# Patient Record
Sex: FEMALE | Race: WHITE | NOT HISPANIC OR LATINO | Employment: FULL TIME | URBAN - NONMETROPOLITAN AREA
[De-identification: names, ages, dates, MRNs, and addresses within clinical notes are randomized per-mention and may not be internally consistent; named-entity substitution may affect disease eponyms.]

---

## 2020-02-27 ENCOUNTER — OFFICE VISIT (OUTPATIENT)
Dept: FAMILY MEDICINE CLINIC | Facility: CLINIC | Age: 55
End: 2020-02-27

## 2020-02-27 VITALS
DIASTOLIC BLOOD PRESSURE: 72 MMHG | SYSTOLIC BLOOD PRESSURE: 108 MMHG | WEIGHT: 196 LBS | OXYGEN SATURATION: 98 % | RESPIRATION RATE: 16 BRPM | HEIGHT: 63 IN | HEART RATE: 80 BPM | BODY MASS INDEX: 34.73 KG/M2 | TEMPERATURE: 98.1 F

## 2020-02-27 DIAGNOSIS — G44.229 CHRONIC TENSION-TYPE HEADACHE, NOT INTRACTABLE: ICD-10-CM

## 2020-02-27 DIAGNOSIS — Z76.89 ENCOUNTER TO ESTABLISH CARE: Primary | ICD-10-CM

## 2020-02-27 PROCEDURE — 99202 OFFICE O/P NEW SF 15 MIN: CPT | Performed by: NURSE PRACTITIONER

## 2020-02-27 RX ORDER — TIZANIDINE 4 MG/1
4 TABLET ORAL EVERY 8 HOURS PRN
Qty: 30 TABLET | Refills: 1 | Status: SHIPPED | OUTPATIENT
Start: 2020-02-27 | End: 2020-04-10 | Stop reason: SDUPTHER

## 2020-02-27 RX ORDER — TOPIRAMATE 25 MG/1
25 TABLET ORAL DAILY
Qty: 30 TABLET | Refills: 2 | Status: SHIPPED | OUTPATIENT
Start: 2020-02-27 | End: 2020-04-10 | Stop reason: SDUPTHER

## 2020-02-27 NOTE — PATIENT INSTRUCTIONS
General Headache Without Cause  A headache is pain or discomfort that is felt around the head or neck area. There are many causes and types of headaches. In some cases, the cause may not be found.  Follow these instructions at home:  Watch your condition for any changes. Let your doctor know about them. Take these steps to help with your condition:  Managing pain         · Take over-the-counter and prescription medicines only as told by your doctor.  · Lie down in a dark, quiet room when you have a headache.  · If told, put ice on your head and neck area:  ? Put ice in a plastic bag.  ? Place a towel between your skin and the bag.  ? Leave the ice on for 20 minutes, 2-3 times per day.  · If told, put heat on the affected area. Use the heat source that your doctor recommends, such as a moist heat pack or a heating pad.  ? Place a towel between your skin and the heat source.  ? Leave the heat on for 20-30 minutes.  ? Remove the heat if your skin turns bright red. This is very important if you are unable to feel pain, heat, or cold. You may have a greater risk of getting burned.  · Keep lights dim if bright lights bother you or make your headaches worse.  Eating and drinking  · Eat meals on a regular schedule.  · If you drink alcohol:  ? Limit how much you use to:  § 0-1 drink a day for women.  § 0-2 drinks a day for men.  ? Be aware of how much alcohol is in your drink. In the [place] one drink equals one 12 oz bottle of beer (355 mL), one 5 oz glass of wine (148 mL), or one 1½ oz glass of hard liquor (44 mL).  · Stop drinking caffeine, or reduce how much caffeine you drink.  General instructions    · Keep a journal to find out if certain things bring on headaches. For example, write down:  ? What you eat and drink.  ? How much sleep you get.  ? Any change to your diet or medicines.  · Get a massage or try other ways to relax.  · Limit stress.  · Sit up straight. Do not tighten (tense) your muscles.  · Do not use any  products that contain nicotine or tobacco. This includes cigarettes, e-cigarettes, and chewing tobacco. If you need help quitting, ask your doctor.  · Exercise regularly as told by your doctor.  · Get enough sleep. This often means 7-9 hours of sleep each night.  · Keep all follow-up visits as told by your doctor. This is important.  Contact a doctor if:  · Your symptoms are not helped by medicine.  · You have a headache that feels different than the other headaches.  · You feel sick to your stomach (nauseous) or you throw up (vomit).  · You have a fever.  Get help right away if:  · Your headache gets very bad quickly.  · Your headache gets worse after a lot of physical activity.  · You keep throwing up.  · You have a stiff neck.  · You have trouble seeing.  · You have trouble speaking.  · You have pain in the eye or ear.  · Your muscles are weak or you lose muscle control.  · You lose your balance or have trouble walking.  · You feel like you will pass out (faint) or you pass out.  · You are mixed up (confused).  · You have a seizure.  Summary  · A headache is pain or discomfort that is felt around the head or neck area.  · There are many causes and types of headaches. In some cases, the cause may not be found.  · Keep a journal to help find out what causes your headaches. Watch your condition for any changes. Let your doctor know about them.  · Contact a doctor if you have a headache that is different from usual, or if your headache is not helped by medicine.  · Get help right away if your headache gets very bad, you throw up, you have trouble seeing, you lose your balance, or you have a seizure.  This information is not intended to replace advice given to you by your health care provider. Make sure you discuss any questions you have with your health care provider.  Document Released: 09/26/2009 Document Revised: 07/08/2019 Document Reviewed: 07/08/2019  Elsevier Interactive Patient Education © 2020 [employer]

## 2020-02-27 NOTE — PROGRESS NOTES
"CC: Establish Care      Subjective:  Jazmin Jennings is a 54 y.o. female who presents for         Here to establish care. No significant PMH. No medications. Has had a  and ovarian cyst removed. Family and Social History reviewed. Does c/o daily headaches ongoing x 1 year. Described as an ache. Treats with cold packs which does help. Rates at 2-7 on pain scale. States is different everyday. Has headaches daily. Denies visual disturbance, light or sound sensitivity, nausea, or vomiting.       The following portions of the patient's history were reviewed and updated as appropriate: allergies, current medications, past family history, past medical history, past social history, past surgical history and problem list.    History reviewed. No pertinent past medical history.      Current Outpatient Medications:   •  tiZANidine (ZANAFLEX) 4 MG tablet, Take 1 tablet by mouth Every 8 (Eight) Hours As Needed for Muscle Spasms., Disp: 30 tablet, Rfl: 1  •  topiramate (TOPAMAX) 25 MG tablet, Take 1 tablet by mouth Daily., Disp: 30 tablet, Rfl: 2    Review of Systems    Review of Systems   Constitutional: Negative.    Eyes: Negative.    Respiratory: Negative.    Cardiovascular: Negative.    Gastrointestinal: Negative.    Endocrine: Negative.    Genitourinary: Negative.    Musculoskeletal: Positive for neck pain.   Skin: Negative.    Allergic/Immunologic: Negative.    Neurological: Positive for headaches.   Hematological: Negative.    Psychiatric/Behavioral: Negative.        Objective  Vitals:    20 1424   BP: 108/72   Pulse: 80   Resp: 16   Temp: 98.1 °F (36.7 °C)   TempSrc: Oral   SpO2: 98%   Weight: 88.9 kg (196 lb)   Height: 160 cm (63\")     Body mass index is 34.72 kg/m².    Physical Exam    Physical Exam   Constitutional: She is oriented to person, place, and time. She appears well-developed and well-nourished. No distress.   HENT:   Head: Normocephalic and atraumatic.   Right Ear: External ear normal.   Left Ear: " External ear normal.   Eyes: Pupils are equal, round, and reactive to light. Conjunctivae and EOM are normal. Right eye exhibits no discharge. Left eye exhibits no discharge. No scleral icterus.   Neck: Normal range of motion. Neck supple. No JVD present. No tracheal deviation present. No thyromegaly present.   Cardiovascular: Normal rate, regular rhythm, normal heart sounds and intact distal pulses. Exam reveals no gallop and no friction rub.   No murmur heard.  Pulmonary/Chest: Effort normal and breath sounds normal. No stridor. No respiratory distress. She has no wheezes. She has no rales. She exhibits no tenderness.   Abdominal: Soft. Bowel sounds are normal. She exhibits no distension and no mass. There is no tenderness. There is no rebound and no guarding. No hernia.   Musculoskeletal: She exhibits no edema.   Lymphadenopathy:     She has no cervical adenopathy.   Neurological: She is alert and oriented to person, place, and time. No cranial nerve deficit.   Skin: Skin is warm and dry.   Psychiatric: She has a normal mood and affect. Her behavior is normal. Judgment and thought content normal.   Nursing note and vitals reviewed.          Jazmin was seen today for establish care.    Diagnoses and all orders for this visit:    Encounter to establish care    Chronic tension-type headache, not intractable  -     tiZANidine (ZANAFLEX) 4 MG tablet; Take 1 tablet by mouth Every 8 (Eight) Hours As Needed for Muscle Spasms.  -     topiramate (TOPAMAX) 25 MG tablet; Take 1 tablet by mouth Daily.       Will initiate zanaflex and topamax as above for headaches since pt is having daily headaches to see if this will help. Will follow up in 3 months for complete exam with labs and pap. All questions answered. Pt verbalized understanding of plan of care.    This document has been electronically signed by FATIMAH Schaefer on February 27, 2020 3:24 PM

## 2020-04-10 DIAGNOSIS — G44.229 CHRONIC TENSION-TYPE HEADACHE, NOT INTRACTABLE: ICD-10-CM

## 2020-04-10 RX ORDER — TIZANIDINE 4 MG/1
4 TABLET ORAL EVERY 8 HOURS PRN
Qty: 30 TABLET | Refills: 1 | Status: SHIPPED | OUTPATIENT
Start: 2020-04-10 | End: 2020-07-14 | Stop reason: SDUPTHER

## 2020-04-10 RX ORDER — TOPIRAMATE 25 MG/1
25 TABLET ORAL DAILY
Qty: 30 TABLET | Refills: 3 | Status: SHIPPED | OUTPATIENT
Start: 2020-04-10 | End: 2021-01-18

## 2020-07-14 DIAGNOSIS — G44.229 CHRONIC TENSION-TYPE HEADACHE, NOT INTRACTABLE: ICD-10-CM

## 2020-07-14 RX ORDER — TIZANIDINE 4 MG/1
4 TABLET ORAL EVERY 8 HOURS PRN
Qty: 30 TABLET | Refills: 1 | Status: SHIPPED | OUTPATIENT
Start: 2020-07-14 | End: 2021-07-13

## 2020-09-22 ENCOUNTER — LAB (OUTPATIENT)
Dept: LAB | Facility: OTHER | Age: 55
End: 2020-09-22

## 2020-09-22 ENCOUNTER — OFFICE VISIT (OUTPATIENT)
Dept: FAMILY MEDICINE CLINIC | Facility: CLINIC | Age: 55
End: 2020-09-22

## 2020-09-22 VITALS
HEIGHT: 63 IN | SYSTOLIC BLOOD PRESSURE: 140 MMHG | DIASTOLIC BLOOD PRESSURE: 92 MMHG | HEART RATE: 84 BPM | BODY MASS INDEX: 36.04 KG/M2 | WEIGHT: 203.4 LBS

## 2020-09-22 DIAGNOSIS — Z00.00 ANNUAL PHYSICAL EXAM: ICD-10-CM

## 2020-09-22 DIAGNOSIS — Z12.31 SCREENING MAMMOGRAM, ENCOUNTER FOR: ICD-10-CM

## 2020-09-22 DIAGNOSIS — Z12.4 PAP SMEAR FOR CERVICAL CANCER SCREENING: ICD-10-CM

## 2020-09-22 DIAGNOSIS — Z00.00 ANNUAL PHYSICAL EXAM: Primary | ICD-10-CM

## 2020-09-22 LAB
ALBUMIN SERPL-MCNC: 4.6 G/DL (ref 3.5–5)
ALBUMIN/GLOB SERPL: 1.4 G/DL (ref 1.1–1.8)
ALP SERPL-CCNC: 58 U/L (ref 38–126)
ALT SERPL W P-5'-P-CCNC: 65 U/L
ANION GAP SERPL CALCULATED.3IONS-SCNC: 7 MMOL/L (ref 5–15)
AST SERPL-CCNC: 39 U/L (ref 14–36)
BASOPHILS # BLD AUTO: 0.06 10*3/MM3 (ref 0–0.2)
BASOPHILS NFR BLD AUTO: 1 % (ref 0–1.5)
BILIRUB SERPL-MCNC: 0.5 MG/DL (ref 0.2–1.3)
BUN SERPL-MCNC: 16 MG/DL (ref 7–23)
BUN/CREAT SERPL: 19 (ref 7–25)
CALCIUM SPEC-SCNC: 9.2 MG/DL (ref 8.4–10.2)
CHLORIDE SERPL-SCNC: 104 MMOL/L (ref 101–112)
CO2 SERPL-SCNC: 29 MMOL/L (ref 22–30)
CREAT SERPL-MCNC: 0.84 MG/DL (ref 0.52–1.04)
DEPRECATED RDW RBC AUTO: 42.6 FL (ref 37–54)
EOSINOPHIL # BLD AUTO: 0.27 10*3/MM3 (ref 0–0.4)
EOSINOPHIL NFR BLD AUTO: 4.7 % (ref 0.3–6.2)
ERYTHROCYTE [DISTWIDTH] IN BLOOD BY AUTOMATED COUNT: 13.2 % (ref 12.3–15.4)
GFR SERPL CREATININE-BSD FRML MDRD: 70 ML/MIN/1.73 (ref 51–120)
GLOBULIN UR ELPH-MCNC: 3.3 GM/DL (ref 2.3–3.5)
GLUCOSE SERPL-MCNC: 103 MG/DL (ref 70–99)
HCT VFR BLD AUTO: 40.8 % (ref 34–46.6)
HGB BLD-MCNC: 13.6 G/DL (ref 12–15.9)
LYMPHOCYTES # BLD AUTO: 2.52 10*3/MM3 (ref 0.7–3.1)
LYMPHOCYTES NFR BLD AUTO: 44 % (ref 19.6–45.3)
MCH RBC QN AUTO: 29.8 PG (ref 26.6–33)
MCHC RBC AUTO-ENTMCNC: 33.3 G/DL (ref 31.5–35.7)
MCV RBC AUTO: 89.5 FL (ref 79–97)
MONOCYTES # BLD AUTO: 0.41 10*3/MM3 (ref 0.1–0.9)
MONOCYTES NFR BLD AUTO: 7.2 % (ref 5–12)
NEUTROPHILS NFR BLD AUTO: 2.47 10*3/MM3 (ref 1.7–7)
NEUTROPHILS NFR BLD AUTO: 43.1 % (ref 42.7–76)
PLATELET # BLD AUTO: 259 10*3/MM3 (ref 140–450)
PMV BLD AUTO: 9.6 FL (ref 6–12)
POTASSIUM SERPL-SCNC: 4.3 MMOL/L (ref 3.4–5)
PROT SERPL-MCNC: 7.9 G/DL (ref 6.3–8.6)
RBC # BLD AUTO: 4.56 10*6/MM3 (ref 3.77–5.28)
SODIUM SERPL-SCNC: 140 MMOL/L (ref 137–145)
WBC # BLD AUTO: 5.73 10*3/MM3 (ref 3.4–10.8)

## 2020-09-22 PROCEDURE — 80061 LIPID PANEL: CPT | Performed by: NURSE PRACTITIONER

## 2020-09-22 PROCEDURE — 80053 COMPREHEN METABOLIC PANEL: CPT | Performed by: NURSE PRACTITIONER

## 2020-09-22 PROCEDURE — 86803 HEPATITIS C AB TEST: CPT | Performed by: NURSE PRACTITIONER

## 2020-09-22 PROCEDURE — 85025 COMPLETE CBC W/AUTO DIFF WBC: CPT | Performed by: NURSE PRACTITIONER

## 2020-09-22 PROCEDURE — 99396 PREV VISIT EST AGE 40-64: CPT | Performed by: NURSE PRACTITIONER

## 2020-09-22 PROCEDURE — 83036 HEMOGLOBIN GLYCOSYLATED A1C: CPT | Performed by: NURSE PRACTITIONER

## 2020-09-22 PROCEDURE — 84443 ASSAY THYROID STIM HORMONE: CPT | Performed by: NURSE PRACTITIONER

## 2020-09-22 PROCEDURE — 36415 COLL VENOUS BLD VENIPUNCTURE: CPT | Performed by: NURSE PRACTITIONER

## 2020-09-22 NOTE — PROGRESS NOTES
"CC: Gynecologic Exam      Subjective:  Jazmin Jennings is a 55 y.o. female who presents for     Here for check up with labs. Is due for pap. LMP 2013. Periods were regular prior to menopause. Denies Post menopausal bleeding. Last pap done 2012. Has never had an abnormal pap smear. Does perform SBE at home. Please see scanned information sheet for details.      The following portions of the patient's history were reviewed and updated as appropriate: allergies, current medications, past family history, past medical history, past social history, past surgical history and problem list.    No past medical history on file.      Current Outpatient Medications:   •  tiZANidine (Zanaflex) 4 MG tablet, Take 1 tablet by mouth Every 8 (Eight) Hours As Needed for Muscle Spasms., Disp: 30 tablet, Rfl: 1  •  topiramate (Topamax) 25 MG tablet, Take 1 tablet by mouth Daily., Disp: 30 tablet, Rfl: 3    Review of Systems    Review of Systems   Constitutional: Negative.    HENT: Negative.    Eyes: Negative.    Respiratory: Negative.    Cardiovascular: Negative.    Gastrointestinal: Negative.    Endocrine: Negative.    Genitourinary: Negative.    Musculoskeletal: Negative.    Skin: Negative.    Allergic/Immunologic: Negative.    Neurological: Negative.    Hematological: Negative.    Psychiatric/Behavioral: Negative.    All other systems reviewed and are negative.      Objective  Vitals:    09/22/20 0916   BP: 140/92   Pulse: 84   Weight: 92.3 kg (203 lb 6.4 oz)   Height: 160 cm (63\")     Body mass index is 36.03 kg/m².    Physical Exam    Physical Exam  Vitals signs and nursing note reviewed. Exam conducted with a chaperone present.   Constitutional:       General: She is not in acute distress.     Appearance: Normal appearance. She is well-developed. She is not ill-appearing, toxic-appearing or diaphoretic.   HENT:      Head: Normocephalic and atraumatic.   Eyes:      General: No scleral icterus.        Right eye: No discharge.         Left " eye: No discharge.      Extraocular Movements: Extraocular movements intact.      Conjunctiva/sclera: Conjunctivae normal.   Neck:      Musculoskeletal: Normal range of motion and neck supple. No neck rigidity or muscular tenderness.      Vascular: No carotid bruit.   Cardiovascular:      Rate and Rhythm: Normal rate and regular rhythm.      Heart sounds: Normal heart sounds. No murmur. No friction rub. No gallop.    Pulmonary:      Effort: Pulmonary effort is normal. No respiratory distress.      Breath sounds: Normal breath sounds. No stridor. No wheezing, rhonchi or rales.   Chest:      Chest wall: No mass, lacerations, deformity, swelling, tenderness, crepitus or edema. There is no dullness to percussion.      Breasts: Breasts are symmetrical.         Right: Normal. No swelling, bleeding, inverted nipple, mass, nipple discharge, skin change or tenderness.         Left: Normal. No swelling, bleeding, inverted nipple, mass, nipple discharge, skin change or tenderness.   Abdominal:      General: Bowel sounds are normal. There is no distension.      Palpations: Abdomen is soft. There is no mass.      Tenderness: There is no abdominal tenderness. There is no guarding or rebound.      Hernia: No hernia is present. There is no hernia in the left inguinal area or right inguinal area.   Genitourinary:     General: Normal vulva.      Pubic Area: No rash or pubic lice.       Labia:         Right: No rash, tenderness, lesion or injury.         Left: No rash, tenderness, lesion or injury.       Urethra: No prolapse, urethral pain, urethral swelling or urethral lesion.      Vagina: No signs of injury and foreign body. No vaginal discharge, erythema, tenderness, bleeding, lesions or prolapsed vaginal walls.      Cervix: No cervical motion tenderness, discharge, friability, lesion, erythema, cervical bleeding or eversion.      Uterus: Normal. Not deviated, not enlarged, not fixed, not tender and no uterine prolapse.        Adnexa: Right adnexa normal and left adnexa normal.        Right: No mass, tenderness or fullness.          Left: No mass, tenderness or fullness.        Rectum: Normal. Guaiac result negative. No mass, tenderness, anal fissure, external hemorrhoid or internal hemorrhoid. Normal anal tone.   Musculoskeletal: Normal range of motion.         General: No swelling.      Right lower leg: No edema.      Left lower leg: No edema.   Lymphadenopathy:      Cervical: No cervical adenopathy.      Upper Body:      Right upper body: No supraclavicular, axillary or pectoral adenopathy.      Left upper body: No supraclavicular, axillary or pectoral adenopathy.      Lower Body: No right inguinal adenopathy. No left inguinal adenopathy.   Skin:     General: Skin is warm and dry.      Capillary Refill: Capillary refill takes less than 2 seconds.      Coloration: Skin is not jaundiced or pale.      Findings: No bruising, erythema, lesion or rash.   Neurological:      General: No focal deficit present.      Mental Status: She is alert and oriented to person, place, and time.   Psychiatric:         Mood and Affect: Mood normal.         Behavior: Behavior normal.         Thought Content: Thought content normal.         Judgment: Judgment normal.             Jazmin was seen today for gynecologic exam.    Diagnoses and all orders for this visit:    Annual physical exam  -     CBC w AUTO Differential; Future  -     Comprehensive metabolic panel; Future  -     TSH  -     Lipid Panel With LDL/HDL Ratio; Future  -     Hemoglobin A1c  -     Hepatitis C antibody; Future    Pap smear for cervical cancer screening  -     Liquid-based Pap Smear, Screening    Screening mammogram, encounter for  -     Mammo screening digital tomosynthesis bilateral w CAD; Future       Will check routine labs as above  Pap performed and sent to lab  Mammogram ordered as above  Pt declined Colonoscopy, Shingrix, and Tdap today  To follow up in 1 year or sooner if  needed  Answered all questions  Pt verbalized understanding of plan of care    This document has been electronically signed by FATIMAH Schaefer on September 22, 2020 09:52 CDT

## 2020-09-23 LAB
CHOLEST SERPL-MCNC: 243 MG/DL (ref 0–200)
HBA1C MFR BLD: 5.4 % (ref 4.8–5.6)
HCV AB SER DONR QL: NORMAL
HDLC SERPL-MCNC: 47 MG/DL (ref 40–60)
LDLC SERPL CALC-MCNC: 168 MG/DL (ref 0–100)
LDLC/HDLC SERPL: 3.58 {RATIO}
TRIGL SERPL-MCNC: 138 MG/DL (ref 0–150)
TSH SERPL DL<=0.05 MIU/L-ACNC: 3.56 UIU/ML (ref 0.27–4.2)
VLDLC SERPL-MCNC: 27.6 MG/DL (ref 5–40)

## 2020-09-28 LAB
LAB AP CASE REPORT: NORMAL
PATH INTERP SPEC-IMP: NORMAL

## 2020-11-11 ENCOUNTER — OFFICE VISIT (OUTPATIENT)
Dept: FAMILY MEDICINE CLINIC | Facility: CLINIC | Age: 55
End: 2020-11-11

## 2020-11-11 DIAGNOSIS — M21.612 BUNION, LEFT FOOT: Primary | ICD-10-CM

## 2020-11-11 DIAGNOSIS — M54.2 NECK PAIN: ICD-10-CM

## 2020-11-11 DIAGNOSIS — R51.9 FREQUENT HEADACHES: ICD-10-CM

## 2020-11-11 PROCEDURE — 99442 PR PHYS/QHP TELEPHONE EVALUATION 11-20 MIN: CPT | Performed by: NURSE PRACTITIONER

## 2020-11-11 RX ORDER — IBUPROFEN 800 MG/1
800 TABLET ORAL EVERY 8 HOURS PRN
Qty: 90 TABLET | Refills: 1 | Status: SHIPPED | OUTPATIENT
Start: 2020-11-11 | End: 2021-07-13 | Stop reason: SDUPTHER

## 2020-11-11 NOTE — PROGRESS NOTES
CC: Lower Extremity Issue (Left foot toes turning inward)      Subjective:  Jazmin Jennings is a 55 y.o. female who presents for   Telephone visit because of pandemic. C/O left foot first toe and 5th toe pointing to the left. Onset gradually over the last couple of years. States developed pain in the toes about 6 weeks ago. When she is up on her foot for long periods of timel, the pain is aggravated. Pain is described as sharp and achy. The pain is pretty much constant unless she sits for long periods. Pain radiates into the ankle. Rates pain at 10 on pain scale at its worst. Has treated with ASA or Tylenol for the pain which doesn't help the pain.    Does have morning headaches/neck pain daily after waking up. Pain radiates from the neck into the head. Treated with ice packs. Denies associated light or sound sensitivity, no nausea or vomiting noted. The ice packs help the pain.      The following portions of the patient's history were reviewed and updated as appropriate: allergies, current medications, past family history, past medical history, past social history, past surgical history and problem list.    History reviewed. No pertinent past medical history.      Current Outpatient Medications:   •  tiZANidine (Zanaflex) 4 MG tablet, Take 1 tablet by mouth Every 8 (Eight) Hours As Needed for Muscle Spasms., Disp: 30 tablet, Rfl: 1  •  topiramate (Topamax) 25 MG tablet, Take 1 tablet by mouth Daily., Disp: 30 tablet, Rfl: 3  •  ibuprofen (ADVIL,MOTRIN) 800 MG tablet, Take 1 tablet by mouth Every 8 (Eight) Hours As Needed for Mild Pain , Moderate Pain  or Headache., Disp: 90 tablet, Rfl: 1    Review of Systems    Review of Systems   Constitutional: Negative.    Eyes: Negative.    Respiratory: Negative.    Cardiovascular: Negative.    Gastrointestinal: Negative.    Endocrine: Negative.    Genitourinary: Negative.    Musculoskeletal: Positive for arthralgias and neck pain.   Skin: Negative.    Allergic/Immunologic:  Negative.    Neurological: Positive for headaches.   Hematological: Negative.    Psychiatric/Behavioral: Negative.    All other systems reviewed and are negative.      Objective  There were no vitals filed for this visit.  There is no height or weight on file to calculate BMI.    Physical Exam    Physical Exam  Deferred, Telephone visit because of pandemic      Diagnoses and all orders for this visit:    1. Bunion, left foot (Primary)  -     Ambulatory Referral to Podiatry  -     ibuprofen (ADVIL,MOTRIN) 800 MG tablet; Take 1 tablet by mouth Every 8 (Eight) Hours As Needed for Mild Pain , Moderate Pain  or Headache.  Dispense: 90 tablet; Refill: 1    2. Neck pain  -     XR Spine Cervical Complete 4 or 5 View; Future  -     ibuprofen (ADVIL,MOTRIN) 800 MG tablet; Take 1 tablet by mouth Every 8 (Eight) Hours As Needed for Mild Pain , Moderate Pain  or Headache.  Dispense: 90 tablet; Refill: 1    3. Frequent headaches  -     XR Spine Cervical Complete 4 or 5 View; Future  -     ibuprofen (ADVIL,MOTRIN) 800 MG tablet; Take 1 tablet by mouth Every 8 (Eight) Hours As Needed for Mild Pain , Moderate Pain  or Headache.  Dispense: 90 tablet; Refill: 1       Will refer patient to podiatry for the bunion of the left foot.  Will obtain x-rays of the C-spine secondary to patient having neck pain and frequent headaches.  Will prescribe ibuprofen 800 mg 1 p.o. 3 times daily as needed as above.  Answered all questions.  Patient verbalized understanding of plan of care.  To follow-up on a as needed basis.    This document has been electronically signed by FATIMAH Schaefer on November 11, 2020 14:04 CST     You have chosen to receive care through a telephone visit. Do you consent to use a telephone visit for your medical care today? Yes  This visit has been rescheduled as a phone visit to comply with patient safety concerns in accordance with CDC recommendations. Total time of discussion was 11 minutes.

## 2020-11-11 NOTE — PATIENT INSTRUCTIONS
Bunion    A bunion is a bump on the base of the big toe that forms when the bones of the big toe joint move out of position. Bunions may be small at first, but they often get larger over time. They can make walking painful.  What are the causes?  A bunion may be caused by:  · Wearing narrow or pointed shoes that force the big toe to press against the other toes.  · Abnormal foot development that causes the foot to roll inward (pronate).  · Changes in the foot that are caused by certain diseases, such as rheumatoid arthritis or polio.  · A foot injury.  What increases the risk?  The following factors may make you more likely to develop this condition:  · Wearing shoes that squeeze the toes together.  · Having certain diseases, such as:  ? Rheumatoid arthritis.  ? Polio.  ? Cerebral palsy.  · Having family members who have bunions.  · Being born with a foot deformity, such as flat feet or low arches.  · Doing activities that put a lot of pressure on the feet, such as ballet dancing.  What are the signs or symptoms?  The main symptom of a bunion is a noticeable bump on the big toe. Other symptoms may include:  · Pain.  · Swelling around the big toe.  · Redness and inflammation.  · Thick or hardened skin on the big toe or between the toes.  · Stiffness or loss of motion in the big toe.  · Trouble with walking.  How is this diagnosed?  A bunion may be diagnosed based on your symptoms, medical history, and activities. You may have tests, such as:  · X-rays. These allow your health care provider to check the position of the bones in your foot and look for damage to your joint. They also help your health care provider determine the severity of your bunion and the best way to treat it.  · Joint aspiration. In this test, a sample of fluid is removed from the toe joint. This test may be done if you are in a lot of pain. It helps rule out diseases that cause painful swelling of the joints, such as arthritis.  How is this  treated?  Treatment depends on the severity of your symptoms. The goal of treatment is to relieve symptoms and prevent the bunion from getting worse. Your health care provider may recommend:  · Wearing shoes that have a wide toe box.  · Using bunion pads to cushion the affected area.  · Taping your toes together to keep them in a normal position.  · Placing a device inside your shoe (orthotics) to help reduce pressure on your toe joint.  · Taking medicine to ease pain, inflammation, and swelling.  · Applying heat or ice to the affected area.  · Doing stretching exercises.  · Surgery to remove scar tissue and move the toes back into their normal position. This treatment is rare.  Follow these instructions at home:  Managing pain, stiffness, and swelling    · If directed, put ice on the painful area:  ? Put ice in a plastic bag.  ? Place a towel between your skin and the bag.  ? Leave the ice on for 20 minutes, 2-3 times a day.  Activity    · If directed, apply heat to the affected area before you exercise. Use the heat source that your health care provider recommends, such as a moist heat pack or a heating pad.  ? Place a towel between your skin and the heat source.  ? Leave the heat on for 20-30 minutes.  ? Remove the heat if your skin turns bright red. This is especially important if you are unable to feel pain, heat, or cold. You may have a greater risk of getting burned.  · Do exercises as told by your health care provider.  General instructions  · Support your toe joint with proper footwear, shoe padding, or taping as told by your health care provider.  · Take over-the-counter and prescription medicines only as told by your health care provider.  · Keep all follow-up visits as told by your health care provider. This is important.  Contact a health care provider if your symptoms:  · Get worse.  · Do not improve in 2 weeks.  Get help right away if you have:  · Severe pain and trouble with walking.  Summary  · A  bunion is a bump on the base of the big toe that forms when the bones of the big toe joint move out of position.  · Bunions can make walking painful.  · Treatment depends on the severity of your symptoms.  · Support your toe joint with proper footwear, shoe padding, or taping as told by your health care provider.  This information is not intended to replace advice given to you by your health care provider. Make sure you discuss any questions you have with your health care provider.  Document Released: 12/18/2006 Document Revised: 06/24/2019 Document Reviewed: 04/30/2019  Elsevier Patient Education © 2020 [employer]

## 2021-01-04 ENCOUNTER — OFFICE VISIT (OUTPATIENT)
Dept: PODIATRY | Facility: CLINIC | Age: 56
End: 2021-01-04

## 2021-01-04 VITALS — HEIGHT: 63 IN | BODY MASS INDEX: 38.62 KG/M2 | OXYGEN SATURATION: 97 % | HEART RATE: 83 BPM | WEIGHT: 218 LBS

## 2021-01-04 DIAGNOSIS — M79.672 LEFT FOOT PAIN: Primary | ICD-10-CM

## 2021-01-04 DIAGNOSIS — M21.612 BUNION OF LEFT FOOT: ICD-10-CM

## 2021-01-04 PROCEDURE — 99204 OFFICE O/P NEW MOD 45 MIN: CPT | Performed by: PODIATRIST

## 2021-01-04 RX ORDER — BUPIVACAINE HCL/0.9 % NACL/PF 0.1 %
2 PLASTIC BAG, INJECTION (ML) EPIDURAL ONCE
Status: CANCELLED | OUTPATIENT
Start: 2021-01-21 | End: 2021-01-04

## 2021-01-04 NOTE — PROGRESS NOTES
Jazmin Jennings  1965  55 y.o. female     Patient presents to clinic today with the complaint of left foot pain, XR obtained today.     2021    Chief Complaint   Patient presents with   • Left Foot - Pain       History of Present Illness    Jazmin Jennings is a 55 y.o.female who presents to clinic today with chief complaint of left foot pain.  Pain is located to the great toe joint.  Pain has been getting progressively worse over the last couple of years.  There are no associated injuries.  Pain is aggravated with prolonged weightbearing and relieved with rest.  She has no other complaints.    Past Medical History:   Diagnosis Date   • Bunion    • Ingrown toenail          Past Surgical History:   Procedure Laterality Date   •  SECTION     • OVARIAN CYST REMOVAL           Family History   Problem Relation Age of Onset   • Heart disease Mother    • Cancer Father    • Cancer Paternal Aunt        No Known Allergies    Social History     Socioeconomic History   • Marital status:      Spouse name: Not on file   • Number of children: Not on file   • Years of education: Not on file   • Highest education level: Not on file   Tobacco Use   • Smoking status: Never Smoker   • Smokeless tobacco: Never Used         Current Outpatient Medications   Medication Sig Dispense Refill   • ibuprofen (ADVIL,MOTRIN) 800 MG tablet Take 1 tablet by mouth Every 8 (Eight) Hours As Needed for Mild Pain , Moderate Pain  or Headache. 90 tablet 1   • tiZANidine (Zanaflex) 4 MG tablet Take 1 tablet by mouth Every 8 (Eight) Hours As Needed for Muscle Spasms. 30 tablet 1   • topiramate (Topamax) 25 MG tablet Take 1 tablet by mouth Daily. 30 tablet 3     No current facility-administered medications for this visit.        Review of Systems   Constitutional: Negative.    HENT: Negative.    Eyes: Negative.    Respiratory: Positive for cough.    Cardiovascular: Negative.    Gastrointestinal: Negative.    Endocrine: Negative.   "  Genitourinary: Negative.    Musculoskeletal:        Foot pain   Skin: Negative.    Allergic/Immunologic: Negative.    Neurological: Negative.    Hematological: Negative.    Psychiatric/Behavioral: Negative.          OBJECTIVE    Pulse 83   Ht 160 cm (63\")   Wt 98.9 kg (218 lb)   SpO2 97%   BMI 38.62 kg/m²       Physical Exam  Vitals signs reviewed.   Constitutional:       General: She is not in acute distress.     Appearance: She is well-developed.   HENT:      Head: Normocephalic and atraumatic.      Nose: Nose normal.   Eyes:      Conjunctiva/sclera: Conjunctivae normal.      Pupils: Pupils are equal, round, and reactive to light.   Cardiovascular:      Rate and Rhythm: Normal rate.      Pulses: Normal pulses.   Pulmonary:      Effort: Pulmonary effort is normal. No respiratory distress.      Breath sounds: No wheezing.   Musculoskeletal: Normal range of motion.         General: Tenderness present. No deformity.   Skin:     General: Skin is warm and dry.      Capillary Refill: Capillary refill takes less than 2 seconds.   Neurological:      Mental Status: She is alert and oriented to person, place, and time.   Psychiatric:         Behavior: Behavior normal.         Thought Content: Thought content normal.         Gait: normal     Assistive Device: none     Left Lower Extremity    Cardiovascular:    DP/PT pulses palpable    CFT brisk  to all digits  Skin temp is warm to warm from proximal tibia to distal digits    Pedal hair growth present.   No erythema or edema noted   Musculoskeletal:  Muscle strength is 5/5 for all muscle groups tested   ROM of the 1st MTP is decreased with pain and crepitus  Moderate HAV deformity.  Pain on palpation to the prominent medial eminence.  ROM of the MTJ is WNL   ROM of the STJ is WNL   ROM of the ankle joint is  WNL   Dermatological:   Skin is warm, dry and intact   Webspaces 1-4 are clean, dry and intact.   No subcutaneous nodules or masses noted    Neurological: "   Protective sensation intact   Sensation intact to light touch        Procedures        ASSESSMENT AND PLAN    Diagnoses and all orders for this visit:    1. Left foot pain (Primary)    2. Bunion of left foot  -     XR Foot 3+ View Left  -     Case Request; Standing  -     ceFAZolin (ANCEF) 2 g in sodium chloride 0.9 % 100 mL IVPB  -     Case Request    Other orders  -     Follow Anesthesia Guidelines / Standing Orders; Standing  -     Verify NPO Status; Standing  -     Obtain informed consent (if not collected inpatient or PAT); Standing  -     Notify Physician - Standard; Standing  -     Follow Anesthesia Guidelines / Standing Orders; Future        - Comprehensive foot and ankle exam performed.   -Radiographs taken reviewed left foot.  No acute osseous or articular abnormalities.  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  -Conservative and surgical treatment options were discussed in detail.  Patient elected to pursue surgical intervention at this time.  -Surgical plan is left first metatarsophalangeal joint arthrodesis.  -Risks and benefits of the procedure including but not limited to postoperative infection, incisional dehiscence, numbness, swelling, residual pain and postoperative blood clot discussed in detail.  No guarantees were given or implied.  -Tentative date for the surgery January 21, 2021  -All questions were answered  -Recheck following surgery             This document has been electronically signed by Chaitanya Jimenes DPM on January 5, 2021 13:28 CST     1/5/2021  13:28 CST

## 2021-01-18 ENCOUNTER — LAB (OUTPATIENT)
Dept: LAB | Facility: HOSPITAL | Age: 56
End: 2021-01-18

## 2021-01-18 DIAGNOSIS — Z01.818 PREOP TESTING: Primary | ICD-10-CM

## 2021-01-18 LAB — SARS-COV-2 ORF1AB RESP QL NAA+PROBE: NOT DETECTED

## 2021-01-18 PROCEDURE — C9803 HOPD COVID-19 SPEC COLLECT: HCPCS

## 2021-01-18 PROCEDURE — U0004 COV-19 TEST NON-CDC HGH THRU: HCPCS

## 2021-01-20 ENCOUNTER — ANESTHESIA EVENT (OUTPATIENT)
Dept: PERIOP | Facility: HOSPITAL | Age: 56
End: 2021-01-20

## 2021-01-21 ENCOUNTER — HOSPITAL ENCOUNTER (OUTPATIENT)
Facility: HOSPITAL | Age: 56
Setting detail: HOSPITAL OUTPATIENT SURGERY
Discharge: HOME | End: 2021-01-21
Attending: PODIATRIST | Admitting: PODIATRIST

## 2021-01-21 ENCOUNTER — APPOINTMENT (OUTPATIENT)
Dept: GENERAL RADIOLOGY | Facility: HOSPITAL | Age: 56
End: 2021-01-21

## 2021-01-21 ENCOUNTER — ANESTHESIA (OUTPATIENT)
Dept: PERIOP | Facility: HOSPITAL | Age: 56
End: 2021-01-21

## 2021-01-21 VITALS
OXYGEN SATURATION: 94 % | HEIGHT: 63 IN | BODY MASS INDEX: 36.91 KG/M2 | HEART RATE: 78 BPM | RESPIRATION RATE: 18 BRPM | DIASTOLIC BLOOD PRESSURE: 76 MMHG | WEIGHT: 208.34 LBS | TEMPERATURE: 97.6 F | SYSTOLIC BLOOD PRESSURE: 152 MMHG

## 2021-01-21 DIAGNOSIS — M21.612 BUNION OF LEFT FOOT: ICD-10-CM

## 2021-01-21 PROCEDURE — C1713 ANCHOR/SCREW BN/BN,TIS/BN: HCPCS | Performed by: PODIATRIST

## 2021-01-21 PROCEDURE — 25010000002 FENTANYL CITRATE (PF) 100 MCG/2ML SOLUTION: Performed by: NURSE ANESTHETIST, CERTIFIED REGISTERED

## 2021-01-21 PROCEDURE — 25010000002 DEXAMETHASONE PER 1 MG: Performed by: NURSE ANESTHETIST, CERTIFIED REGISTERED

## 2021-01-21 PROCEDURE — 25010000002 PROPOFOL 10 MG/ML EMULSION: Performed by: NURSE ANESTHETIST, CERTIFIED REGISTERED

## 2021-01-21 PROCEDURE — 25010000002 MIDAZOLAM PER 1 MG: Performed by: NURSE ANESTHETIST, CERTIFIED REGISTERED

## 2021-01-21 PROCEDURE — 28750 FUSION OF BIG TOE JOINT: CPT | Performed by: PODIATRIST

## 2021-01-21 PROCEDURE — 25010000002 ROPIVACAINE PER 1 MG: Performed by: NURSE ANESTHETIST, CERTIFIED REGISTERED

## 2021-01-21 PROCEDURE — 76000 FLUOROSCOPY <1 HR PHYS/QHP: CPT

## 2021-01-21 PROCEDURE — 25010000002 PHENYLEPHRINE PER 1 ML: Performed by: NURSE ANESTHETIST, CERTIFIED REGISTERED

## 2021-01-21 PROCEDURE — 25010000002 ONDANSETRON PER 1 MG: Performed by: NURSE ANESTHETIST, CERTIFIED REGISTERED

## 2021-01-21 PROCEDURE — 25010000002 CEFAZOLIN PER 500 MG: Performed by: PODIATRIST

## 2021-01-21 DEVICE — CP LAG SCREW (T8)
Type: IMPLANTABLE DEVICE | Site: FOOT | Status: FUNCTIONAL
Brand: ANCHORAGE

## 2021-01-21 DEVICE — BONE SCREW, FULLY THREADED, T8
Type: IMPLANTABLE DEVICE | Site: FOOT | Status: FUNCTIONAL
Brand: VARIAX

## 2021-01-21 DEVICE — LOCKING SCREW, FULLY THREADED,T8
Type: IMPLANTABLE DEVICE | Site: FOOT | Status: FUNCTIONAL
Brand: VARIAX

## 2021-01-21 DEVICE — POLYAXIAL LOCKING PLATE -  MTP CROSS-PLATE, LEFT (T8)
Type: IMPLANTABLE DEVICE | Site: FOOT | Status: FUNCTIONAL
Brand: ANCHORAGE

## 2021-01-21 RX ORDER — BUPIVACAINE HCL/0.9 % NACL/PF 0.1 %
2 PLASTIC BAG, INJECTION (ML) EPIDURAL ONCE
Status: COMPLETED | OUTPATIENT
Start: 2021-01-21 | End: 2021-01-21

## 2021-01-21 RX ORDER — MIDAZOLAM HYDROCHLORIDE 1 MG/ML
INJECTION INTRAMUSCULAR; INTRAVENOUS AS NEEDED
Status: DISCONTINUED | OUTPATIENT
Start: 2021-01-21 | End: 2021-01-21 | Stop reason: SURG

## 2021-01-21 RX ORDER — DEXAMETHASONE SODIUM PHOSPHATE 4 MG/ML
INJECTION, SOLUTION INTRA-ARTICULAR; INTRALESIONAL; INTRAMUSCULAR; INTRAVENOUS; SOFT TISSUE AS NEEDED
Status: DISCONTINUED | OUTPATIENT
Start: 2021-01-21 | End: 2021-01-21 | Stop reason: SURG

## 2021-01-21 RX ORDER — SCOLOPAMINE TRANSDERMAL SYSTEM 1 MG/1
1 PATCH, EXTENDED RELEASE TRANSDERMAL CONTINUOUS
Status: DISCONTINUED | OUTPATIENT
Start: 2021-01-21 | End: 2021-01-21 | Stop reason: HOSPADM

## 2021-01-21 RX ORDER — ONDANSETRON 2 MG/ML
INJECTION INTRAMUSCULAR; INTRAVENOUS AS NEEDED
Status: DISCONTINUED | OUTPATIENT
Start: 2021-01-21 | End: 2021-01-21 | Stop reason: SURG

## 2021-01-21 RX ORDER — EPHEDRINE SULFATE 50 MG/ML
INJECTION, SOLUTION INTRAVENOUS AS NEEDED
Status: DISCONTINUED | OUTPATIENT
Start: 2021-01-21 | End: 2021-01-21 | Stop reason: SURG

## 2021-01-21 RX ORDER — OXYCODONE AND ACETAMINOPHEN 7.5; 325 MG/1; MG/1
1 TABLET ORAL EVERY 4 HOURS PRN
Qty: 30 TABLET | Refills: 0 | Status: SHIPPED | OUTPATIENT
Start: 2021-01-21 | End: 2021-07-13

## 2021-01-21 RX ORDER — BACITRACIN ZINC 500 [USP'U]/G
OINTMENT TOPICAL AS NEEDED
Status: DISCONTINUED | OUTPATIENT
Start: 2021-01-21 | End: 2021-01-21 | Stop reason: HOSPADM

## 2021-01-21 RX ORDER — FENTANYL CITRATE 50 UG/ML
INJECTION, SOLUTION INTRAMUSCULAR; INTRAVENOUS AS NEEDED
Status: DISCONTINUED | OUTPATIENT
Start: 2021-01-21 | End: 2021-01-21 | Stop reason: SURG

## 2021-01-21 RX ORDER — ROPIVACAINE HYDROCHLORIDE 5 MG/ML
INJECTION, SOLUTION EPIDURAL; INFILTRATION; PERINEURAL
Status: COMPLETED | OUTPATIENT
Start: 2021-01-21 | End: 2021-01-21

## 2021-01-21 RX ORDER — SODIUM CHLORIDE, SODIUM GLUCONATE, SODIUM ACETATE, POTASSIUM CHLORIDE, AND MAGNESIUM CHLORIDE 526; 502; 368; 37; 30 MG/100ML; MG/100ML; MG/100ML; MG/100ML; MG/100ML
1000 INJECTION, SOLUTION INTRAVENOUS CONTINUOUS
Status: DISCONTINUED | OUTPATIENT
Start: 2021-01-21 | End: 2021-01-21 | Stop reason: HOSPADM

## 2021-01-21 RX ORDER — LIDOCAINE HYDROCHLORIDE 20 MG/ML
INJECTION, SOLUTION INFILTRATION; PERINEURAL AS NEEDED
Status: DISCONTINUED | OUTPATIENT
Start: 2021-01-21 | End: 2021-01-21 | Stop reason: SURG

## 2021-01-21 RX ORDER — PROPOFOL 10 MG/ML
VIAL (ML) INTRAVENOUS AS NEEDED
Status: DISCONTINUED | OUTPATIENT
Start: 2021-01-21 | End: 2021-01-21 | Stop reason: SURG

## 2021-01-21 RX ADMIN — EPHEDRINE SULFATE 5 MG: 50 INJECTION INTRAVENOUS at 08:05

## 2021-01-21 RX ADMIN — Medication 2 G: at 07:28

## 2021-01-21 RX ADMIN — FENTANYL CITRATE 25 MCG: 50 INJECTION, SOLUTION INTRAMUSCULAR; INTRAVENOUS at 07:31

## 2021-01-21 RX ADMIN — DEXAMETHASONE SODIUM PHOSPHATE 4 MG: 4 INJECTION, SOLUTION INTRAMUSCULAR; INTRAVENOUS at 07:33

## 2021-01-21 RX ADMIN — LIDOCAINE HYDROCHLORIDE 100 MG: 20 INJECTION, SOLUTION INFILTRATION; PERINEURAL at 07:22

## 2021-01-21 RX ADMIN — PHENYLEPHRINE HYDROCHLORIDE 100 MCG: 10 INJECTION INTRAVENOUS at 08:24

## 2021-01-21 RX ADMIN — ROPIVACAINE HYDROCHLORIDE 30 ML: 5 INJECTION, SOLUTION EPIDURAL; INFILTRATION; PERINEURAL at 07:13

## 2021-01-21 RX ADMIN — EPHEDRINE SULFATE 5 MG: 50 INJECTION INTRAVENOUS at 08:14

## 2021-01-21 RX ADMIN — PROPOFOL 160 MG: 10 INJECTION, EMULSION INTRAVENOUS at 07:24

## 2021-01-21 RX ADMIN — MIDAZOLAM HYDROCHLORIDE 2 MG: 2 INJECTION, SOLUTION INTRAMUSCULAR; INTRAVENOUS at 07:13

## 2021-01-21 RX ADMIN — EPHEDRINE SULFATE 10 MG: 50 INJECTION INTRAVENOUS at 08:23

## 2021-01-21 RX ADMIN — EPHEDRINE SULFATE 5 MG: 50 INJECTION INTRAVENOUS at 07:54

## 2021-01-21 RX ADMIN — SODIUM CHLORIDE, SODIUM GLUCONATE, SODIUM ACETATE, POTASSIUM CHLORIDE, AND MAGNESIUM CHLORIDE 1000 ML: 526; 502; 368; 37; 30 INJECTION, SOLUTION INTRAVENOUS at 06:44

## 2021-01-21 RX ADMIN — ONDANSETRON 4 MG: 2 INJECTION INTRAMUSCULAR; INTRAVENOUS at 08:47

## 2021-01-21 NOTE — ANESTHESIA POSTPROCEDURE EVALUATION
Patient: Jazmin Jennings    Procedure Summary     Date: 01/21/21 Room / Location: [facility] OR  / [facility] OR    Anesthesia Start: 0718 Anesthesia Stop: 0907    Procedure: FIRST METATARSOPHALANGEAL JOINT ARTHRODESIS (Left Foot) Diagnosis:       Bunion of left foot      (Bunion of left foot [M21.612])    Surgeon: Chaitanya Jimenes DPM Provider: Jose A Sullivan MD    Anesthesia Type: general with block ASA Status: 3          Anesthesia Type: general with block    Vitals  No vitals data found for the desired time range.          Post Anesthesia Care and Evaluation    Patient location during evaluation: PACU  Patient participation: complete - patient participated  Level of consciousness: sleepy but conscious  Pain score: 0  Pain management: adequate  Airway patency: patent  Anesthetic complications: No anesthetic complications  PONV Status: none  Cardiovascular status: hemodynamically stable  Respiratory status: spontaneous ventilation and face mask  Hydration status: acceptable

## 2021-01-21 NOTE — ANESTHESIA PROCEDURE NOTES
Peripheral Block      Patient reassessed immediately prior to procedure    Patient location during procedure: holding area  Start time: 1/21/2021 7:08 AM  Stop time: 1/21/2021 7:14 AM  Reason for block: at surgeon's request and post-op pain management  Performed by  Anesthesiologist: Jose A Sullivan MD  Assisted by: Raghav Hancock SRNA  Preanesthetic Checklist  Completed: patient identified, site marked, surgical consent, pre-op evaluation, timeout performed, IV checked, risks and benefits discussed and monitors and equipment checked  Prep:  Pt Position: right lateral decubitus  Sterile barriers:gloves, mask, washed/disinfected hands and cap  Prep: ChloraPrep  Patient monitoring: blood pressure monitoring and continuous pulse oximetry  Procedure  Sedation:no  Performed under: local infiltration  Guidance:ultrasound guided  ULTRASOUND INTERPRETATION.  Using ultrasound guidance a 21 G gauge needle was placed in close proximity to the sciatic nerve, at which point, under ultrasound guidance anesthetic was injected in the area of the nerve and spread of the anesthesia was seen on ultrasound in close proximity thereto.  There were no abnormalities seen on ultrasound; a digital image was taken; and the patient tolerated the procedure with no complications. Images:still images obtained, printed/placed on chart    Laterality:left  Block Type:popliteal  Injection Technique:single-shot  Needle Type:echogenic  Needle Gauge:21 G  Resistance on Injection: none    Medications Used: ropivacaine (NAROPIN) 0.5 % injection, 30 mL  Med admintered at 1/21/2021 7:13 AM      Medications  Comment:Pt ID   Ultrasound guided   Needle seen throughout procedure   Local infiltration appropriate    Post Assessment  Injection Assessment: negative aspiration for heme, no paresthesia on injection and incremental injection  Patient Tolerance:comfortable throughout block  Complications:no

## 2021-01-21 NOTE — ANESTHESIA PREPROCEDURE EVALUATION
Anesthesia Evaluation     history of anesthetic complications: PONV  NPO Solid Status: > 8 hours  NPO Liquid Status: > 8 hours           Airway   Mallampati: II  TM distance: >3 FB  Neck ROM: full  Possible difficult intubation  Dental    (+) poor dentition        Pulmonary - normal exam    breath sounds clear to auscultation  (+) sleep apnea,   (-) COPD, asthma, not a smoker  Cardiovascular - negative cardio ROS and normal exam  Exercise tolerance: good (4-7 METS)    Rhythm: regular  Rate: normal    (-) hypertension, valvular problems/murmurs, dysrhythmias, angina, cardiac stents, DVT, hyperlipidemia      Neuro/Psych  (+) headaches,     (-) seizures, TIA, CVA, numbness, psychiatric history  GI/Hepatic/Renal/Endo    (+) obesity,  GERD well controlled,    (-) hepatitis, liver disease, no renal disease, diabetes, no thyroid disorder    Musculoskeletal     (+) arthralgias,   Abdominal   (+) obese,    Substance History   (-) alcohol use, drug use     OB/GYN    (-)  Pregnant        Other   arthritis (great toe),      (-) history of cancer                  Anesthesia Plan    ASA 3     general with block   (Serrano  Popliteal block discussed and patient agrees to proceed)  intravenous induction     Anesthetic plan, all risks, benefits, and alternatives have been provided, discussed and informed consent has been obtained with: patient.

## 2021-01-21 NOTE — DISCHARGE INSTRUCTIONS
Discharge home  Resume normal diet  Keep dressing c/d/i (clean,dry,intact)  Ice and elevate on blue foam elevator  wbat in cam boot with crutches (weight bearing as tolerated)  Obtain crutches  Percocet 7.5 mg prn pain   Follow up Monday 1/25/21          This document has been electronically signed by Cahitanya Jimenes DPM on January 21, 2021 09:15 CST

## 2021-01-21 NOTE — ANESTHESIA PROCEDURE NOTES
Airway  Date/Time: 1/21/2021 7:25 AM    General Information and Staff    Patient location during procedure: OR  CRNA: Stephany Sotelo CRNA    Indications and Patient Condition  Indications for airway management: airway protection    Preoxygenated: yes  Mask difficulty assessment: 1 - vent by mask    Final Airway Details  Final airway type: supraglottic airway      Successful airway: LMA and I-gel  Size 4    Assessment: lips, teeth, and gum same as pre-op and atraumatic intubation    Additional Comments  Inserted per DENNIS Hancock SRNA

## 2021-01-21 NOTE — BRIEF OP NOTE
TOE INTERPHALANGEAL JOINT/METATARSOPHALANGEAL JOINT FUSION  Progress Note    Jazmin William Duty  1/21/2021    Pre-op Diagnosis:   Bunion of left foot [M21.612]       Post-Op Diagnosis Codes:     * Bunion of left foot [M21.612]    Procedure/CPT® Codes:      Procedure(s):  FIRST METATARSOPHALANGEAL JOINT ARTHRODESIS    Surgeon(s):  Chaitanya Jimenes DPM    Anesthesia: General with Block    Staff:   Circulator: Hilary Su RN; Keli Porter RN  Scrub Person: Nicole Major  Vendor Representative: Murali Bryant  Assistant: Chaparrita Cool  Assistant: Chaparrita Cool      Estimated Blood Loss: minimal    Urine Voided: * No values recorded between 1/21/2021  7:17 AM and 1/21/2021  9:12 AM *    Specimens:                None          Drains: * No LDAs found *    Findings: consistent with pre-op dx    Complications: none     Assistant: Chaparrita Cool  was responsible for performing the following activities: Retraction, Suction, Irrigation and Harvesting of Vessels and their skilled assistance was necessary for the success of this case.    Chaitanya Jimenes DPM     Date: 1/21/2021  Time: 09:12 CST

## 2021-01-21 NOTE — INTERVAL H&P NOTE
H&P reviewed. The patient was examined and there are no changes to the H&P.              This document has been electronically signed by Chaitanya Jimenes DPM on January 21, 2021 06:53 CST

## 2021-01-22 NOTE — OP NOTE
Date of Procedure: 1/21/21     SURGEON: Chaitanya Jimenes DPM      Assistant: Chaparrita Cool was responsible for performing the following activities: Retraction, Irrigation and Suturing and their skilled assistance was necessary for the success of this case.      PREOPERATIVE DIAGNOSIS:   1.Hallux valgus left     POSTOPERATIVE DIAGNOSIS: Same as preop     PROCEDURES PERFORMED:  Left first metatarsophalangeal joint arthrodesis     ANESTHESIA: General with block     HEMOSTASIS: Pneumatic ankle tourniquet set at 250 mmHg.      ESTIMATED BLOOD LOSS: 15 mL.      SPECIMEN: none     MATERIALS: Baca CP 2 locking plate      INJECTABLES: None     COMPLICATIONS: None.      CONDITION: Stable.      INDICATIONS FOR PROCEDURE: This is a patient of mine who has left foot pain. She agrees to undergo the surgical intervention at this time. Consent is signed and documented in the chart. History and physical exam were reviewed prior to patient being taken to the operating room and n.p.o. status was confirmed. No guarantees were given or implied regarding the outcome of this treatment.      DESCRIPTION OF PROCEDURE: After mild sedation was administered by the anesthesia team in the preoperative holding area the patient was transported to the operating room and placed in a supine position.  General anesthesia was then administered and the left foot was prepped and draped in usual sterile technique and a timeout was performed to confirm the correct patient, correct site, and correct procedure.      Attention was then directed to the left foot which was exsanguinated using an Esmarch bandage and tourniquet was rapidly inflated 250 mmHg.  Next a linear incision was made to the dorsal medial aspect of the first metatarsophalangeal joint.  This incision was deepened to subcutaneous tissue taking care to identify and retract all vital neurovascular structures.  Next a linear capsulotomy was made at the first metatarsophalangeal  joint and the head of the first metatarsal and base of the first proximal phalanx were exposed.  Next utilizing the Shilo cup and cone reamers the articular cartilage from the head of the metatarsal and base of the phalanx was denuded.  Joint was fenestrated.  Joint was reduced and fixated with Coleman CP 2 locking plate with locking and nonlocking screws.  Intraoperative fluoroscopy was used throughout the entire process to ensure that joint was well reduced and the hardware was well-placed.  Incision site was flushed and closed with 3-0 Vicryl, 4-0 monocryl and 4-0 nylon.      Plan is to discharge this patient home once stable per anesthesia.  She can resume her normal diet.  She is to be partial weightbearing to left heel in the cam boot.  Dressing is to remain clean, dry and intact.  Patient will follow-up with me on Monday, 1/22/21.          This document has been electronically signed by Chaitanya Jimenes DPM on January 22, 2021 12:06 CST

## 2021-01-25 ENCOUNTER — OFFICE VISIT (OUTPATIENT)
Dept: PODIATRY | Facility: CLINIC | Age: 56
End: 2021-01-25

## 2021-01-25 VITALS — WEIGHT: 208 LBS | HEIGHT: 63 IN | OXYGEN SATURATION: 94 % | HEART RATE: 77 BPM | BODY MASS INDEX: 36.86 KG/M2

## 2021-01-25 DIAGNOSIS — M21.612 BUNION OF LEFT FOOT: Primary | ICD-10-CM

## 2021-01-25 PROCEDURE — 99024 POSTOP FOLLOW-UP VISIT: CPT | Performed by: PODIATRIST

## 2021-01-25 RX ORDER — HYDROCODONE BITARTRATE AND ACETAMINOPHEN 10; 325 MG/1; MG/1
1 TABLET ORAL EVERY 6 HOURS PRN
Qty: 28 TABLET | Refills: 0 | Status: SHIPPED | OUTPATIENT
Start: 2021-01-25 | End: 2021-07-13

## 2021-01-25 RX ORDER — ONDANSETRON 8 MG/1
8 TABLET, ORALLY DISINTEGRATING ORAL EVERY 8 HOURS PRN
Qty: 30 TABLET | Refills: 0 | Status: SHIPPED | OUTPATIENT
Start: 2021-01-25 | End: 2021-07-13

## 2021-01-25 NOTE — PROGRESS NOTES
Jazmin Jennings  1965  55 y.o. female     Patient return to clinic for a post op appointment on left foot Bunion     2021     Chief Complaint   Patient presents with   • Left Foot - Follow-up, Post-op       History of Present Illness    Jazmin Jennings is a 55 y.o.female who presents to clinic today for her first postoperative appointment.  Patient had left first MTP arthrodesis on .  She is ambulating in a cam boot.  States that her postoperative pain is controlled.  She does complain of some nausea when taking the pain medication.    Past Medical History:   Diagnosis Date   • Arthritis    • Bunion    • GERD (gastroesophageal reflux disease)    • Ingrown toenail    • PONV (postoperative nausea and vomiting)          Past Surgical History:   Procedure Laterality Date   •  SECTION     • OVARIAN CYST REMOVAL     • TOE FUSION Left 2021    Procedure: FIRST METATARSOPHALANGEAL JOINT ARTHRODESIS;  Surgeon: Chaitanya Jimenes DPM;  Location: [facility];  Service: Podiatry;  Laterality: Left;         Family History   Problem Relation Age of Onset   • Heart disease Mother    • Cancer Father    • Cancer Paternal Aunt        No Known Allergies    Social History     Socioeconomic History   • Marital status:      Spouse name: Not on file   • Number of children: Not on file   • Years of education: Not on file   • Highest education level: Not on file   Tobacco Use   • Smoking status: Never Smoker   • Smokeless tobacco: Never Used   Substance and Sexual Activity   • Alcohol use: Yes     Comment: socially   • Drug use: Never   • Sexual activity: Defer         Current Outpatient Medications   Medication Sig Dispense Refill   • ibuprofen (ADVIL,MOTRIN) 800 MG tablet Take 1 tablet by mouth Every 8 (Eight) Hours As Needed for Mild Pain , Moderate Pain  or Headache. 90 tablet 1   • oxyCODONE-acetaminophen (Percocet) 7.5-325 MG per tablet Take 1 tablet by mouth Every 4 (Four) Hours As Needed for Moderate  "Pain. 30 tablet 0   • tiZANidine (Zanaflex) 4 MG tablet Take 1 tablet by mouth Every 8 (Eight) Hours As Needed for Muscle Spasms. 30 tablet 1   • HYDROcodone-acetaminophen (Norco)  MG per tablet Take 1 tablet by mouth Every 6 (Six) Hours As Needed for Moderate Pain . 28 tablet 0   • ondansetron ODT (Zofran ODT) 8 MG disintegrating tablet Place 1 tablet on the tongue Every 8 (Eight) Hours As Needed for Nausea or Vomiting. 30 tablet 0     No current facility-administered medications for this visit.        Review of Systems   Constitutional: Negative.    Respiratory: Negative.    Musculoskeletal:        Left foot pain   Psychiatric/Behavioral: Negative.          OBJECTIVE    Pulse 77   Ht 160 cm (63\")   Wt 94.3 kg (208 lb)   SpO2 94%   BMI 36.85 kg/m²       Physical Exam  Vitals signs reviewed.   Constitutional:       General: She is not in acute distress.     Appearance: She is well-developed.   HENT:      Head: Normocephalic and atraumatic.   Pulmonary:      Effort: Pulmonary effort is normal. No respiratory distress.      Breath sounds: No wheezing.   Skin:     General: Skin is warm.      Capillary Refill: Capillary refill takes less than 2 seconds.   Neurological:      Mental Status: She is alert and oriented to person, place, and time.   Psychiatric:         Behavior: Behavior normal.         Thought Content: Thought content normal.         Left lower extremity: Incision site well approximated.  No signs of dehiscence.  Moderate edema and ecchymosis.  CFT immediate to distal digit.  Negative Homans.  Hallux is rectus.      Procedures        ASSESSMENT AND PLAN    Diagnoses and all orders for this visit:    1. Bunion of left foot (Primary)  -     HYDROcodone-acetaminophen (Norco)  MG per tablet; Take 1 tablet by mouth Every 6 (Six) Hours As Needed for Moderate Pain .  Dispense: 28 tablet; Refill: 0    Other orders  -     ondansetron ODT (Zofran ODT) 8 MG disintegrating tablet; Place 1 tablet on the " tongue Every 8 (Eight) Hours As Needed for Nausea or Vomiting.  Dispense: 30 tablet; Refill: 0        Patient is doing well postoperatively.  Dressing change performed.  Keep clean, dry and intact.  Weightbearing in cam boot only.  Ice and elevate at home.  Rx Norco 10 mg and Zofran.  Recheck 1 week, suture removal and repeat radiographs            This document has been electronically signed by Chaitanya Jimenes DPM on January 25, 2021 13:00 CST     1/25/2021  13:00 CST

## 2021-02-03 ENCOUNTER — OFFICE VISIT (OUTPATIENT)
Dept: PODIATRY | Facility: CLINIC | Age: 56
End: 2021-02-03

## 2021-02-03 VITALS — WEIGHT: 208 LBS | OXYGEN SATURATION: 98 % | HEIGHT: 63 IN | HEART RATE: 86 BPM | BODY MASS INDEX: 36.86 KG/M2

## 2021-02-03 DIAGNOSIS — M21.612 BUNION OF LEFT FOOT: Primary | ICD-10-CM

## 2021-02-03 PROCEDURE — 99024 POSTOP FOLLOW-UP VISIT: CPT | Performed by: PODIATRIST

## 2021-02-03 NOTE — PROGRESS NOTES
Jazmin Jennings  1965  55 y.o. female     Patient return to clinic for a post op appointment on left foot Bunion.    2021     Chief Complaint   Patient presents with   • Left Foot - Follow-up, Post-op       History of Present Illness    Jazmin Jennings is a 55 y.o.female who presents to clinic today for her second postoperative appointment.  Patient had left first MTP arthrodesis on .      Past Medical History:   Diagnosis Date   • Arthritis    • Bunion    • GERD (gastroesophageal reflux disease)    • Ingrown toenail    • PONV (postoperative nausea and vomiting)          Past Surgical History:   Procedure Laterality Date   •  SECTION     • OVARIAN CYST REMOVAL     • TOE FUSION Left 2021    Procedure: FIRST METATARSOPHALANGEAL JOINT ARTHRODESIS;  Surgeon: Chaitanya Jimenes DPM;  Location: [facility];  Service: Podiatry;  Laterality: Left;         Family History   Problem Relation Age of Onset   • Heart disease Mother    • Cancer Father    • Cancer Paternal Aunt        No Known Allergies    Social History     Socioeconomic History   • Marital status:      Spouse name: Not on file   • Number of children: Not on file   • Years of education: Not on file   • Highest education level: Not on file   Tobacco Use   • Smoking status: Never Smoker   • Smokeless tobacco: Never Used   Substance and Sexual Activity   • Alcohol use: Yes     Comment: socially   • Drug use: Never   • Sexual activity: Defer         Current Outpatient Medications   Medication Sig Dispense Refill   • HYDROcodone-acetaminophen (Norco)  MG per tablet Take 1 tablet by mouth Every 6 (Six) Hours As Needed for Moderate Pain . 28 tablet 0   • ibuprofen (ADVIL,MOTRIN) 800 MG tablet Take 1 tablet by mouth Every 8 (Eight) Hours As Needed for Mild Pain , Moderate Pain  or Headache. 90 tablet 1   • ondansetron ODT (Zofran ODT) 8 MG disintegrating tablet Place 1 tablet on the tongue Every 8 (Eight) Hours As Needed for Nausea  "or Vomiting. 30 tablet 0   • oxyCODONE-acetaminophen (Percocet) 7.5-325 MG per tablet Take 1 tablet by mouth Every 4 (Four) Hours As Needed for Moderate Pain. 30 tablet 0   • tiZANidine (Zanaflex) 4 MG tablet Take 1 tablet by mouth Every 8 (Eight) Hours As Needed for Muscle Spasms. 30 tablet 1     No current facility-administered medications for this visit.        Review of Systems   Constitutional: Negative.    HENT: Negative.    Respiratory: Negative.    Gastrointestinal: Negative.    Psychiatric/Behavioral: Negative.          OBJECTIVE    Pulse 86   Ht 160 cm (63\")   Wt 94.3 kg (208 lb)   SpO2 98%   BMI 36.85 kg/m²       Physical Exam  Vitals signs reviewed.   Constitutional:       General: She is not in acute distress.     Appearance: She is well-developed.   HENT:      Head: Normocephalic and atraumatic.   Pulmonary:      Effort: Pulmonary effort is normal. No respiratory distress.      Breath sounds: No wheezing.   Skin:     General: Skin is warm.      Capillary Refill: Capillary refill takes less than 2 seconds.   Neurological:      Mental Status: She is alert and oriented to person, place, and time.   Psychiatric:         Behavior: Behavior normal.         Thought Content: Thought content normal.         Left lower extremity: Incision site well approximated.  No signs of dehiscence.  Moderate edema and ecchymosis.  CFT immediate to distal digit.  Negative Homans.  Hallux is rectus.      Procedures        ASSESSMENT AND PLAN    Diagnoses and all orders for this visit:    1. Bunion of left foot (Primary)  -     XR Foot 3+ View Left        - Patient is doing well postoperatively.  Dressing change performed.  Keep clean, dry and intact.  Weightbearing in cam boot only.  Ice and elevate at home.  Rx Norco 10 mg and Zofran.  Recheck 1 week, suture removal and repeat radiographs            This document has been electronically signed by Chaitanya Jimenes DPM on February 3, 2021 12:07 CST     2/3/2021  12:07 " CST

## 2021-02-12 ENCOUNTER — TELEPHONE (OUTPATIENT)
Dept: PODIATRY | Facility: CLINIC | Age: 56
End: 2021-02-12

## 2021-02-12 DIAGNOSIS — M21.612 BUNION OF LEFT FOOT: Primary | ICD-10-CM

## 2021-02-12 RX ORDER — HYDROCODONE BITARTRATE AND ACETAMINOPHEN 5; 325 MG/1; MG/1
1 TABLET ORAL EVERY 12 HOURS PRN
Qty: 14 TABLET | Refills: 0 | Status: SHIPPED | OUTPATIENT
Start: 2021-02-12 | End: 2021-07-13

## 2021-02-12 NOTE — TELEPHONE ENCOUNTER
Script : Norco 1/25/21    Last seen in office : 2/3/21      PT REQUESTS A CALL BACK RE IS HAVING SOME LEG PAIN AND REDNESS FOR 2 DAYS  AT SURGICAL INCISION AREA.  REQUESTS PAIN MEDS BE CALLED IN FOR THIS.  I TOLD HER DR ANDERSON IS OUT OF OFFICE AT THIS TIME AND SOMEONE WILL RETURN HER CALL WHEN POSSIBLE.  CALL BACK # [phone].  THANK YOU.

## 2021-02-12 NOTE — TELEPHONE ENCOUNTER
PT REQUESTS A CALL BACK RE IS HAVING SOME LEG PAIN AND REDNESS FOR 2 DAYS  AT SURGICAL INCISION AREA.  REQUESTS PAIN MEDS BE CALLED IN FOR THIS.  I TOLD HER DR ANDERSON IS OUT OF OFFICE AT THIS TIME AND SOMEONE WILL RETURN HER CALL WHEN POSSIBLE.  CALL BACK # [phone].  THANK YOU.

## 2021-02-15 NOTE — TELEPHONE ENCOUNTER
PT REQUESTS A CALL BACK RE WANTS TO KNOW IF SOMETHING ELSE CAN BE PRESCRIBED FOR PAIN BESIDES A NARCOTIC.  CALL BACK # [phone].  THANK YOU.

## 2021-02-24 ENCOUNTER — OFFICE VISIT (OUTPATIENT)
Dept: PODIATRY | Facility: CLINIC | Age: 56
End: 2021-02-24

## 2021-02-24 VITALS — BODY MASS INDEX: 36.86 KG/M2 | HEIGHT: 63 IN | WEIGHT: 208 LBS | OXYGEN SATURATION: 97 % | HEART RATE: 79 BPM

## 2021-02-24 DIAGNOSIS — M21.612 BUNION OF LEFT FOOT: Primary | ICD-10-CM

## 2021-02-24 PROCEDURE — 99024 POSTOP FOLLOW-UP VISIT: CPT | Performed by: PODIATRIST

## 2021-02-24 NOTE — PROGRESS NOTES
Jazmin Jennings  1965  55 y.o. female     Patient return to clinic for a post op appointment on left foot Bunion. Xray obtained today     2021     Chief Complaint   Patient presents with   • Left Foot - Post-op       History of Present Illness    Jazmin Jennings is a 55 y.o.female who presents to clinic today for her third postoperative appointment.  Patient had left first MTP arthrodesis on .      Past Medical History:   Diagnosis Date   • Arthritis    • Bunion    • GERD (gastroesophageal reflux disease)    • Ingrown toenail    • PONV (postoperative nausea and vomiting)          Past Surgical History:   Procedure Laterality Date   •  SECTION     • OVARIAN CYST REMOVAL     • TOE FUSION Left 2021    Procedure: FIRST METATARSOPHALANGEAL JOINT ARTHRODESIS;  Surgeon: Chaitanya Jimenes DPM;  Location: [facility];  Service: Podiatry;  Laterality: Left;         Family History   Problem Relation Age of Onset   • Heart disease Mother    • Cancer Father    • Cancer Paternal Aunt        No Known Allergies    Social History     Socioeconomic History   • Marital status:      Spouse name: Not on file   • Number of children: Not on file   • Years of education: Not on file   • Highest education level: Not on file   Tobacco Use   • Smoking status: Never Smoker   • Smokeless tobacco: Never Used   Substance and Sexual Activity   • Alcohol use: Yes     Comment: socially   • Drug use: Never   • Sexual activity: Defer         Current Outpatient Medications   Medication Sig Dispense Refill   • HYDROcodone-acetaminophen (Norco)  MG per tablet Take 1 tablet by mouth Every 6 (Six) Hours As Needed for Moderate Pain . 28 tablet 0   • HYDROcodone-acetaminophen (Norco) 5-325 MG per tablet Take 1 tablet by mouth Every 12 (Twelve) Hours As Needed for Moderate Pain . 14 tablet 0   • ibuprofen (ADVIL,MOTRIN) 800 MG tablet Take 1 tablet by mouth Every 8 (Eight) Hours As Needed for Mild Pain , Moderate Pain  or  "Headache. 90 tablet 1   • ondansetron ODT (Zofran ODT) 8 MG disintegrating tablet Place 1 tablet on the tongue Every 8 (Eight) Hours As Needed for Nausea or Vomiting. 30 tablet 0   • oxyCODONE-acetaminophen (Percocet) 7.5-325 MG per tablet Take 1 tablet by mouth Every 4 (Four) Hours As Needed for Moderate Pain. 30 tablet 0   • tiZANidine (Zanaflex) 4 MG tablet Take 1 tablet by mouth Every 8 (Eight) Hours As Needed for Muscle Spasms. 30 tablet 1     No current facility-administered medications for this visit.        Review of Systems   Constitutional: Negative.    HENT: Negative.    Respiratory: Negative.    Gastrointestinal: Negative.    Psychiatric/Behavioral: Negative.          OBJECTIVE    Pulse 79   Ht 160 cm (63\")   Wt 94.3 kg (208 lb)   SpO2 97%   BMI 36.85 kg/m²       Physical Exam  Vitals signs reviewed.   Constitutional:       General: She is not in acute distress.     Appearance: She is well-developed.   HENT:      Head: Normocephalic and atraumatic.   Pulmonary:      Effort: Pulmonary effort is normal. No respiratory distress.      Breath sounds: No wheezing.   Skin:     General: Skin is warm.      Capillary Refill: Capillary refill takes less than 2 seconds.   Neurological:      Mental Status: She is alert and oriented to person, place, and time.   Psychiatric:         Behavior: Behavior normal.         Thought Content: Thought content normal.         Left lower extremity: Incision site nearly healed. Small area of delayed healing distally. No drainage or surround erythema. Mild edema.  CFT immediate to distal digit.  Negative Homans.  Hallux is rectus.      Procedures        ASSESSMENT AND PLAN    Diagnoses and all orders for this visit:    1. Bunion of left foot (Primary)  -     XR Foot 3+ View Left        - Patient is doing well postoperatively.  Incisional site care as instructed.  Radiographs taken reviewed.  Weightbearing in cam boot only.  Recheck 2 weeks            This document has been " electronically signed by Chaitanya Jimenes DPM on February 24, 2021 12:01 CST     2/24/2021  12:01 CST

## 2021-03-10 ENCOUNTER — OFFICE VISIT (OUTPATIENT)
Dept: PODIATRY | Facility: CLINIC | Age: 56
End: 2021-03-10

## 2021-03-10 VITALS — OXYGEN SATURATION: 98 % | WEIGHT: 208 LBS | HEIGHT: 63 IN | HEART RATE: 87 BPM | BODY MASS INDEX: 36.86 KG/M2

## 2021-03-10 DIAGNOSIS — M21.612 BUNION OF LEFT FOOT: ICD-10-CM

## 2021-03-10 PROCEDURE — 99024 POSTOP FOLLOW-UP VISIT: CPT | Performed by: PODIATRIST

## 2021-03-10 NOTE — PROGRESS NOTES
Jazmin Jennings  1965  55 y.o. female     Patient presents today for a post op recheck of her left foot.    03/10/2021     Chief Complaint   Patient presents with   • Left Foot - post op recheck       History of Present Illness    Jazmin Jennings is a 55 y.o.female who presents to clinic today for her fourth postoperative appointment.  Patient had left first MTP arthrodesis on .      Past Medical History:   Diagnosis Date   • Arthritis    • Bunion    • GERD (gastroesophageal reflux disease)    • Ingrown toenail    • PONV (postoperative nausea and vomiting)          Past Surgical History:   Procedure Laterality Date   •  SECTION     • OVARIAN CYST REMOVAL     • TOE FUSION Left 2021    Procedure: FIRST METATARSOPHALANGEAL JOINT ARTHRODESIS;  Surgeon: Chaitanya Jimenes DPM;  Location: [facility];  Service: Podiatry;  Laterality: Left;         Family History   Problem Relation Age of Onset   • Heart disease Mother    • Cancer Father    • Cancer Paternal Aunt        No Known Allergies    Social History     Socioeconomic History   • Marital status:      Spouse name: Not on file   • Number of children: Not on file   • Years of education: Not on file   • Highest education level: Not on file   Tobacco Use   • Smoking status: Never Smoker   • Smokeless tobacco: Never Used   Vaping Use   • Vaping Use: Never used   Substance and Sexual Activity   • Alcohol use: Yes     Comment: socially   • Drug use: Never   • Sexual activity: Defer         Current Outpatient Medications   Medication Sig Dispense Refill   • HYDROcodone-acetaminophen (Norco)  MG per tablet Take 1 tablet by mouth Every 6 (Six) Hours As Needed for Moderate Pain . 28 tablet 0   • HYDROcodone-acetaminophen (Norco) 5-325 MG per tablet Take 1 tablet by mouth Every 12 (Twelve) Hours As Needed for Moderate Pain . 14 tablet 0   • ibuprofen (ADVIL,MOTRIN) 800 MG tablet Take 1 tablet by mouth Every 8 (Eight) Hours As Needed for Mild Pain  ", Moderate Pain  or Headache. 90 tablet 1   • ondansetron ODT (Zofran ODT) 8 MG disintegrating tablet Place 1 tablet on the tongue Every 8 (Eight) Hours As Needed for Nausea or Vomiting. 30 tablet 0   • oxyCODONE-acetaminophen (Percocet) 7.5-325 MG per tablet Take 1 tablet by mouth Every 4 (Four) Hours As Needed for Moderate Pain. 30 tablet 0   • tiZANidine (Zanaflex) 4 MG tablet Take 1 tablet by mouth Every 8 (Eight) Hours As Needed for Muscle Spasms. 30 tablet 1     No current facility-administered medications for this visit.       Review of Systems   Constitutional: Negative.    HENT: Negative.    Respiratory: Negative.    Cardiovascular: Negative.    Gastrointestinal: Negative.    Endocrine: Negative.    Genitourinary: Negative.    Psychiatric/Behavioral: Negative.          OBJECTIVE    Pulse 87   Ht 160 cm (63\")   Wt 94.3 kg (208 lb)   SpO2 98%   BMI 36.85 kg/m²       Physical Exam  Vitals reviewed.   Constitutional:       General: She is not in acute distress.     Appearance: She is well-developed.   HENT:      Head: Normocephalic and atraumatic.   Pulmonary:      Effort: Pulmonary effort is normal. No respiratory distress.      Breath sounds: No wheezing.   Skin:     General: Skin is warm.      Capillary Refill: Capillary refill takes less than 2 seconds.   Neurological:      Mental Status: She is alert and oriented to person, place, and time.   Psychiatric:         Behavior: Behavior normal.         Thought Content: Thought content normal.         Left lower extremity: Incision site healed. minimal edema.  CFT immediate to distal digit.  Negative Homans.  Hallux is rectus.      Procedures        ASSESSMENT AND PLAN    Diagnoses and all orders for this visit:    1. Bunion of left foot  -     XR Foot 3+ View Left        - Patient is doing well postoperatively.   Radiographs taken reviewed.  Continue weightbearing in cam boot .  Recheck 2 weeks, repeat radiographs             This document has been " electronically signed by Chaitanya Jimenes DPM on March 10, 2021 09:47 CST     3/10/2021  09:47 CST

## 2021-03-24 ENCOUNTER — OFFICE VISIT (OUTPATIENT)
Dept: PODIATRY | Facility: CLINIC | Age: 56
End: 2021-03-24

## 2021-03-24 VITALS — OXYGEN SATURATION: 98 % | HEART RATE: 75 BPM | WEIGHT: 208 LBS | HEIGHT: 63 IN | BODY MASS INDEX: 36.86 KG/M2

## 2021-03-24 DIAGNOSIS — M21.612 BUNION OF LEFT FOOT: Primary | ICD-10-CM

## 2021-03-24 PROCEDURE — 99024 POSTOP FOLLOW-UP VISIT: CPT | Performed by: PODIATRIST

## 2021-03-24 NOTE — PROGRESS NOTES
Jazmin Jennings  1965  55 y.o. female     Patient returns today for a post op recheck of her left foot. Xray obtained today.     2021     Chief Complaint   Patient presents with   • Left Foot - Post-op       History of Present Illness    Jazmin Jennings is a 55 y.o.female who presents to clinic today for her fifth postoperative appointment.  Patient had left first MTP arthrodesis on .  Patient is weightbearing pain-free in a cam boot today.    Past Medical History:   Diagnosis Date   • Arthritis    • Bunion    • GERD (gastroesophageal reflux disease)    • Ingrown toenail    • PONV (postoperative nausea and vomiting)          Past Surgical History:   Procedure Laterality Date   •  SECTION     • OVARIAN CYST REMOVAL     • TOE FUSION Left 2021    Procedure: FIRST METATARSOPHALANGEAL JOINT ARTHRODESIS;  Surgeon: Chaitanya Jimenes DPM;  Location: [facility];  Service: Podiatry;  Laterality: Left;         Family History   Problem Relation Age of Onset   • Heart disease Mother    • Cancer Father    • Cancer Paternal Aunt        No Known Allergies    Social History     Socioeconomic History   • Marital status:      Spouse name: Not on file   • Number of children: Not on file   • Years of education: Not on file   • Highest education level: Not on file   Tobacco Use   • Smoking status: Never Smoker   • Smokeless tobacco: Never Used   Vaping Use   • Vaping Use: Never used   Substance and Sexual Activity   • Alcohol use: Yes     Comment: socially   • Drug use: Never   • Sexual activity: Defer         Current Outpatient Medications   Medication Sig Dispense Refill   • HYDROcodone-acetaminophen (Norco)  MG per tablet Take 1 tablet by mouth Every 6 (Six) Hours As Needed for Moderate Pain . 28 tablet 0   • HYDROcodone-acetaminophen (Norco) 5-325 MG per tablet Take 1 tablet by mouth Every 12 (Twelve) Hours As Needed for Moderate Pain . 14 tablet 0   • ibuprofen (ADVIL,MOTRIN) 800 MG tablet  "Take 1 tablet by mouth Every 8 (Eight) Hours As Needed for Mild Pain , Moderate Pain  or Headache. 90 tablet 1   • ondansetron ODT (Zofran ODT) 8 MG disintegrating tablet Place 1 tablet on the tongue Every 8 (Eight) Hours As Needed for Nausea or Vomiting. 30 tablet 0   • oxyCODONE-acetaminophen (Percocet) 7.5-325 MG per tablet Take 1 tablet by mouth Every 4 (Four) Hours As Needed for Moderate Pain. 30 tablet 0   • tiZANidine (Zanaflex) 4 MG tablet Take 1 tablet by mouth Every 8 (Eight) Hours As Needed for Muscle Spasms. 30 tablet 1     No current facility-administered medications for this visit.       Review of Systems   Constitutional: Negative.    HENT: Negative.    Respiratory: Negative.    Cardiovascular: Negative.    Gastrointestinal: Negative.    Psychiatric/Behavioral: Negative.          OBJECTIVE    Pulse 75   Ht 160 cm (63\")   Wt 94.3 kg (208 lb)   SpO2 98%   BMI 36.85 kg/m²       Physical Exam  Vitals reviewed.   Constitutional:       General: She is not in acute distress.     Appearance: She is well-developed.   HENT:      Head: Normocephalic and atraumatic.   Pulmonary:      Effort: Pulmonary effort is normal. No respiratory distress.      Breath sounds: No wheezing.   Skin:     General: Skin is warm.      Capillary Refill: Capillary refill takes less than 2 seconds.   Neurological:      Mental Status: She is alert and oriented to person, place, and time.   Psychiatric:         Behavior: Behavior normal.         Thought Content: Thought content normal.         Left lower extremity: Incision site healed.  No edema.  CFT immediate to distal digit.  Negative Homans.  Hallux is rectus.  Absent range of motion the first MTP without pain.  Muscle strength within normal limits.    Procedures        ASSESSMENT AND PLAN    Diagnoses and all orders for this visit:    1. Bunion of left foot (Primary)  -     XR Foot 3+ View Left        - Patient is doing well postoperatively.   Radiographs taken reviewed.  " Hardware intact with no signs of loosening.  Transition to athletic shoe gear.  Recheck 4 weeks, repeat radiographs             This document has been electronically signed by Chaitanya Jimenes DPM on March 24, 2021 09:09 CDT     3/24/2021  09:09 CDT

## 2021-04-12 ENCOUNTER — OFFICE VISIT (OUTPATIENT)
Dept: PODIATRY | Facility: CLINIC | Age: 56
End: 2021-04-12

## 2021-04-12 VITALS — HEART RATE: 102 BPM | HEIGHT: 63 IN | OXYGEN SATURATION: 98 % | WEIGHT: 208 LBS | BODY MASS INDEX: 36.86 KG/M2

## 2021-04-12 DIAGNOSIS — M21.612 BUNION OF LEFT FOOT: Primary | ICD-10-CM

## 2021-04-12 PROCEDURE — 99024 POSTOP FOLLOW-UP VISIT: CPT | Performed by: PODIATRIST

## 2021-04-12 NOTE — PROGRESS NOTES
Jazmin Jennings  1965  55 y.o. female     2021     Chief Complaint   Patient presents with   • Left Foot - Follow-up, Post-op       History of Present Illness    Jazmin Jennings is a 55 y.o.female who presents to clinic today for her sixth postoperative appointment.  Patient had left first MTP arthrodesis on .  Patient is weightbearing in regular shoe gear.  States that a lump has developed on the top of her foot.  It is slightly painful.    Past Medical History:   Diagnosis Date   • Arthritis    • Bunion    • GERD (gastroesophageal reflux disease)    • Ingrown toenail    • PONV (postoperative nausea and vomiting)          Past Surgical History:   Procedure Laterality Date   •  SECTION     • OVARIAN CYST REMOVAL     • TOE FUSION Left 2021    Procedure: FIRST METATARSOPHALANGEAL JOINT ARTHRODESIS;  Surgeon: Chaitanya Jimenes DPM;  Location: [facility];  Service: Podiatry;  Laterality: Left;         Family History   Problem Relation Age of Onset   • Heart disease Mother    • Cancer Father    • Cancer Paternal Aunt        No Known Allergies    Social History     Socioeconomic History   • Marital status:      Spouse name: Not on file   • Number of children: Not on file   • Years of education: Not on file   • Highest education level: Not on file   Tobacco Use   • Smoking status: Never Smoker   • Smokeless tobacco: Never Used   Vaping Use   • Vaping Use: Never used   Substance and Sexual Activity   • Alcohol use: Yes     Comment: socially   • Drug use: Never   • Sexual activity: Defer         Current Outpatient Medications   Medication Sig Dispense Refill   • HYDROcodone-acetaminophen (Norco)  MG per tablet Take 1 tablet by mouth Every 6 (Six) Hours As Needed for Moderate Pain . 28 tablet 0   • HYDROcodone-acetaminophen (Norco) 5-325 MG per tablet Take 1 tablet by mouth Every 12 (Twelve) Hours As Needed for Moderate Pain . 14 tablet 0   • ibuprofen (ADVIL,MOTRIN) 800 MG tablet Take  "1 tablet by mouth Every 8 (Eight) Hours As Needed for Mild Pain , Moderate Pain  or Headache. 90 tablet 1   • ondansetron ODT (Zofran ODT) 8 MG disintegrating tablet Place 1 tablet on the tongue Every 8 (Eight) Hours As Needed for Nausea or Vomiting. 30 tablet 0   • oxyCODONE-acetaminophen (Percocet) 7.5-325 MG per tablet Take 1 tablet by mouth Every 4 (Four) Hours As Needed for Moderate Pain. 30 tablet 0   • tiZANidine (Zanaflex) 4 MG tablet Take 1 tablet by mouth Every 8 (Eight) Hours As Needed for Muscle Spasms. 30 tablet 1     No current facility-administered medications for this visit.       Review of Systems   Constitutional: Negative.    HENT: Negative.    Respiratory: Negative.    Cardiovascular: Negative.    Gastrointestinal: Negative.    Psychiatric/Behavioral: Negative.          OBJECTIVE    Pulse 102   Ht 160 cm (63\")   Wt 94.3 kg (208 lb)   SpO2 98%   BMI 36.85 kg/m²       Physical Exam  Vitals reviewed.   Constitutional:       General: She is not in acute distress.     Appearance: She is well-developed.   HENT:      Head: Normocephalic and atraumatic.   Pulmonary:      Effort: Pulmonary effort is normal. No respiratory distress.      Breath sounds: No wheezing.   Skin:     General: Skin is warm.      Capillary Refill: Capillary refill takes less than 2 seconds.   Neurological:      Mental Status: She is alert and oriented to person, place, and time.   Psychiatric:         Behavior: Behavior normal.         Thought Content: Thought content normal.         Left lower extremity: Incision site healed.  Mild edema dorsal foot.  CFT immediate to distal digit.  Negative Homans.  Hallux is rectus.  Absent range of motion the first MTP without pain.  Muscle strength within normal limits.    Procedures        ASSESSMENT AND PLAN    Diagnoses and all orders for this visit:    1. Bunion of left foot (Primary)  -     XR Foot 3+ View Left        -  Radiographs taken reviewed.  Hardware intact with no signs of " loosening.  Continue weightbearing in athletic shoe gear.  Recommended compression stocking for edema control.  Recheck 2 weeks            This document has been electronically signed by Larissa Landry MA on April 12, 2021 15:32 CDT     4/12/2021  15:32 CDT

## 2021-04-21 ENCOUNTER — OFFICE VISIT (OUTPATIENT)
Dept: PODIATRY | Facility: CLINIC | Age: 56
End: 2021-04-21

## 2021-04-21 VITALS — OXYGEN SATURATION: 98 % | HEART RATE: 87 BPM | WEIGHT: 208 LBS | HEIGHT: 63 IN | BODY MASS INDEX: 36.86 KG/M2

## 2021-04-21 DIAGNOSIS — M21.612 BUNION OF LEFT FOOT: Primary | ICD-10-CM

## 2021-04-21 PROCEDURE — 99024 POSTOP FOLLOW-UP VISIT: CPT | Performed by: PODIATRIST

## 2021-04-21 NOTE — PROGRESS NOTES
Jazmin Jennings  1965  55 y.o. female     Post op recheck left foot 1st MTP arthrodesis.    2021     Chief Complaint   Patient presents with   • Left Foot - post op recheck, Pain       History of Present Illness    Jazmin Jennings is a 55 y.o.female who presents to clinic today for follow up. Patient had left first MTP arthrodesis on .  Patient is weightbearing in regular shoe gear.  She has minimal pain.     Past Medical History:   Diagnosis Date   • Arthritis    • Bunion    • GERD (gastroesophageal reflux disease)    • Ingrown toenail    • PONV (postoperative nausea and vomiting)          Past Surgical History:   Procedure Laterality Date   •  SECTION     • OVARIAN CYST REMOVAL     • TOE FUSION Left 2021    Procedure: FIRST METATARSOPHALANGEAL JOINT ARTHRODESIS;  Surgeon: Chaitanya Jimenes DPM;  Location: [facility];  Service: Podiatry;  Laterality: Left;         Family History   Problem Relation Age of Onset   • Heart disease Mother    • Cancer Father    • Cancer Paternal Aunt        No Known Allergies    Social History     Socioeconomic History   • Marital status:      Spouse name: Not on file   • Number of children: Not on file   • Years of education: Not on file   • Highest education level: Not on file   Tobacco Use   • Smoking status: Never Smoker   • Smokeless tobacco: Never Used   Vaping Use   • Vaping Use: Never used   Substance and Sexual Activity   • Alcohol use: Yes     Comment: socially   • Drug use: Never   • Sexual activity: Defer         Current Outpatient Medications   Medication Sig Dispense Refill   • HYDROcodone-acetaminophen (Norco)  MG per tablet Take 1 tablet by mouth Every 6 (Six) Hours As Needed for Moderate Pain . 28 tablet 0   • HYDROcodone-acetaminophen (Norco) 5-325 MG per tablet Take 1 tablet by mouth Every 12 (Twelve) Hours As Needed for Moderate Pain . 14 tablet 0   • ibuprofen (ADVIL,MOTRIN) 800 MG tablet Take 1 tablet by mouth Every 8  "(Eight) Hours As Needed for Mild Pain , Moderate Pain  or Headache. 90 tablet 1   • ondansetron ODT (Zofran ODT) 8 MG disintegrating tablet Place 1 tablet on the tongue Every 8 (Eight) Hours As Needed for Nausea or Vomiting. 30 tablet 0   • oxyCODONE-acetaminophen (Percocet) 7.5-325 MG per tablet Take 1 tablet by mouth Every 4 (Four) Hours As Needed for Moderate Pain. 30 tablet 0   • tiZANidine (Zanaflex) 4 MG tablet Take 1 tablet by mouth Every 8 (Eight) Hours As Needed for Muscle Spasms. 30 tablet 1     No current facility-administered medications for this visit.       Review of Systems   Constitutional: Negative.    HENT: Negative.    Respiratory: Negative.    Cardiovascular: Negative.    Gastrointestinal: Negative.    Musculoskeletal:        Left foot pain     Psychiatric/Behavioral: Negative.          OBJECTIVE    Pulse 87   Ht 160 cm (63\")   Wt 94.3 kg (208 lb)   SpO2 98%   BMI 36.85 kg/m²       Physical Exam  Vitals reviewed.   Constitutional:       General: She is not in acute distress.     Appearance: She is well-developed.   HENT:      Head: Normocephalic and atraumatic.   Pulmonary:      Effort: Pulmonary effort is normal. No respiratory distress.      Breath sounds: No wheezing.   Skin:     General: Skin is warm.      Capillary Refill: Capillary refill takes less than 2 seconds.   Neurological:      Mental Status: She is alert and oriented to person, place, and time.   Psychiatric:         Behavior: Behavior normal.         Thought Content: Thought content normal.         Left lower extremity: Incision site healed.  Minimal edema dorsal foot.  CFT immediate to distal digit.  Negative Homans.  Hallux is rectus.  Absent range of motion the first MTP without pain.  Muscle strength within normal limits.    Procedures        ASSESSMENT AND PLAN    Diagnoses and all orders for this visit:    1. Bunion of left foot (Primary)        -  Patient improving.  Progress activity as tolerated.  Continue compression " stocking for edema control.  Recheck 2 weeks            This document has been electronically signed by Chaitanya Jimenes DPM on April 23, 2021 08:42 CDT     4/23/2021  08:42 CDT

## 2021-05-05 ENCOUNTER — OFFICE VISIT (OUTPATIENT)
Dept: PODIATRY | Facility: CLINIC | Age: 56
End: 2021-05-05

## 2021-05-05 VITALS — HEART RATE: 82 BPM | OXYGEN SATURATION: 98 % | BODY MASS INDEX: 36.86 KG/M2 | WEIGHT: 208 LBS | HEIGHT: 63 IN

## 2021-05-05 DIAGNOSIS — M21.612 BUNION OF LEFT FOOT: Primary | ICD-10-CM

## 2021-05-05 PROCEDURE — 99212 OFFICE O/P EST SF 10 MIN: CPT | Performed by: PODIATRIST

## 2021-05-05 NOTE — PROGRESS NOTES
Jazmin Jennings  1965  56 y.o. female     2021     Chief Complaint   Patient presents with   • Left Foot - Follow-up       History of Present Illness    Jazmin Jennings is a 56 y.o.female who presents to clinic today for follow up. Patient had left first MTP arthrodesis on .  Patient is weightbearing in regular shoe gear.  States that the swelling has significantly decreased.  She denies pain.    Past Medical History:   Diagnosis Date   • Arthritis    • Bunion    • GERD (gastroesophageal reflux disease)    • Ingrown toenail    • PONV (postoperative nausea and vomiting)          Past Surgical History:   Procedure Laterality Date   •  SECTION     • OVARIAN CYST REMOVAL     • TOE FUSION Left 2021    Procedure: FIRST METATARSOPHALANGEAL JOINT ARTHRODESIS;  Surgeon: Chaitanya Jimenes DPM;  Location: [facility];  Service: Podiatry;  Laterality: Left;         Family History   Problem Relation Age of Onset   • Heart disease Mother    • Cancer Father    • Cancer Paternal Aunt        No Known Allergies    Social History     Socioeconomic History   • Marital status:      Spouse name: Not on file   • Number of children: Not on file   • Years of education: Not on file   • Highest education level: Not on file   Tobacco Use   • Smoking status: Never Smoker   • Smokeless tobacco: Never Used   Vaping Use   • Vaping Use: Never used   Substance and Sexual Activity   • Alcohol use: Yes     Comment: socially   • Drug use: Never   • Sexual activity: Defer         Current Outpatient Medications   Medication Sig Dispense Refill   • HYDROcodone-acetaminophen (Norco)  MG per tablet Take 1 tablet by mouth Every 6 (Six) Hours As Needed for Moderate Pain . 28 tablet 0   • HYDROcodone-acetaminophen (Norco) 5-325 MG per tablet Take 1 tablet by mouth Every 12 (Twelve) Hours As Needed for Moderate Pain . 14 tablet 0   • ibuprofen (ADVIL,MOTRIN) 800 MG tablet Take 1 tablet by mouth Every 8 (Eight) Hours As  "Needed for Mild Pain , Moderate Pain  or Headache. 90 tablet 1   • ondansetron ODT (Zofran ODT) 8 MG disintegrating tablet Place 1 tablet on the tongue Every 8 (Eight) Hours As Needed for Nausea or Vomiting. 30 tablet 0   • oxyCODONE-acetaminophen (Percocet) 7.5-325 MG per tablet Take 1 tablet by mouth Every 4 (Four) Hours As Needed for Moderate Pain. 30 tablet 0   • tiZANidine (Zanaflex) 4 MG tablet Take 1 tablet by mouth Every 8 (Eight) Hours As Needed for Muscle Spasms. 30 tablet 1     No current facility-administered medications for this visit.       Review of Systems   Constitutional: Negative.    HENT: Negative.    Respiratory: Negative.    Cardiovascular: Negative.    Gastrointestinal: Negative.    Musculoskeletal: Negative.    Skin: Negative.    Psychiatric/Behavioral: Negative.          OBJECTIVE    Pulse 82   Ht 160 cm (63\")   Wt 94.3 kg (208 lb)   SpO2 98%   BMI 36.85 kg/m²       Physical Exam  Vitals reviewed.   Constitutional:       General: She is not in acute distress.     Appearance: She is well-developed.   HENT:      Head: Normocephalic and atraumatic.   Pulmonary:      Effort: Pulmonary effort is normal. No respiratory distress.      Breath sounds: No wheezing.   Skin:     General: Skin is warm.      Capillary Refill: Capillary refill takes less than 2 seconds.   Neurological:      Mental Status: She is alert and oriented to person, place, and time.   Psychiatric:         Behavior: Behavior normal.         Thought Content: Thought content normal.         Left lower extremity: Incision site healed.  No edema .CFT immediate to distal digit.  Negative Homans.  Hallux is rectus.  Absent range of motion the first MTP without pain.  Muscle strength within normal limits.    Procedures        ASSESSMENT AND PLAN    Diagnoses and all orders for this visit:    1. Bunion of left foot (Primary)  -     XR Foot 3+ View Left        -Patient doing very well.  Radiographs taken and reviewed.  Progress activity " as tolerated.  Recheck 8 weeks, repeat radiographs            This document has been electronically signed by Chaitanya Jimenes DPM on May 5, 2021 11:59 CDT     5/5/2021  11:59 CDT

## 2021-06-30 ENCOUNTER — OFFICE VISIT (OUTPATIENT)
Dept: PODIATRY | Facility: CLINIC | Age: 56
End: 2021-06-30

## 2021-06-30 VITALS — OXYGEN SATURATION: 97 % | BODY MASS INDEX: 36.86 KG/M2 | WEIGHT: 208 LBS | HEIGHT: 63 IN | HEART RATE: 77 BPM

## 2021-06-30 DIAGNOSIS — M21.612 BUNION OF LEFT FOOT: Primary | ICD-10-CM

## 2021-06-30 PROCEDURE — 99212 OFFICE O/P EST SF 10 MIN: CPT | Performed by: PODIATRIST

## 2021-06-30 NOTE — PROGRESS NOTES
Jazmin Jennings  1965  56 y.o. female     2021     Chief Complaint   Patient presents with   • Left Foot - Follow-up       History of Present Illness    Jazmin Jennings is a 56 y.o.female who presents to clinic today for follow up. Patient had left first MTP arthrodesis on .  Patient is currently weightbearing in regular shoe gear pain-free.  States that she is doing very well and is happy with her surgical outcome.    Past Medical History:   Diagnosis Date   • Arthritis    • Bunion    • GERD (gastroesophageal reflux disease)    • Ingrown toenail    • PONV (postoperative nausea and vomiting)          Past Surgical History:   Procedure Laterality Date   •  SECTION     • OVARIAN CYST REMOVAL     • TOE FUSION Left 2021    Procedure: FIRST METATARSOPHALANGEAL JOINT ARTHRODESIS;  Surgeon: Chaitanya Jimenes DPM;  Location: [facility];  Service: Podiatry;  Laterality: Left;         Family History   Problem Relation Age of Onset   • Heart disease Mother    • Cancer Father    • Cancer Paternal Aunt        No Known Allergies    Social History     Socioeconomic History   • Marital status:      Spouse name: Not on file   • Number of children: Not on file   • Years of education: Not on file   • Highest education level: Not on file   Tobacco Use   • Smoking status: Never Smoker   • Smokeless tobacco: Never Used   Vaping Use   • Vaping Use: Never used   Substance and Sexual Activity   • Alcohol use: Yes     Comment: socially   • Drug use: Never   • Sexual activity: Defer         Current Outpatient Medications   Medication Sig Dispense Refill   • HYDROcodone-acetaminophen (Norco)  MG per tablet Take 1 tablet by mouth Every 6 (Six) Hours As Needed for Moderate Pain . 28 tablet 0   • HYDROcodone-acetaminophen (Norco) 5-325 MG per tablet Take 1 tablet by mouth Every 12 (Twelve) Hours As Needed for Moderate Pain . 14 tablet 0   • ibuprofen (ADVIL,MOTRIN) 800 MG tablet Take 1 tablet by mouth  "Every 8 (Eight) Hours As Needed for Mild Pain , Moderate Pain  or Headache. 90 tablet 1   • ondansetron ODT (Zofran ODT) 8 MG disintegrating tablet Place 1 tablet on the tongue Every 8 (Eight) Hours As Needed for Nausea or Vomiting. 30 tablet 0   • oxyCODONE-acetaminophen (Percocet) 7.5-325 MG per tablet Take 1 tablet by mouth Every 4 (Four) Hours As Needed for Moderate Pain. 30 tablet 0   • tiZANidine (Zanaflex) 4 MG tablet Take 1 tablet by mouth Every 8 (Eight) Hours As Needed for Muscle Spasms. 30 tablet 1     No current facility-administered medications for this visit.       Review of Systems   Constitutional: Negative.    HENT: Negative.    Respiratory: Negative.    Cardiovascular: Negative.    Gastrointestinal: Negative.    Musculoskeletal: Negative.    Skin: Negative.    Psychiatric/Behavioral: Negative.          OBJECTIVE    Pulse 77   Ht 160 cm (63\")   Wt 94.3 kg (208 lb)   SpO2 97%   BMI 36.85 kg/m²       Physical Exam  Vitals reviewed.   Constitutional:       General: She is not in acute distress.     Appearance: She is well-developed.   HENT:      Head: Normocephalic and atraumatic.   Pulmonary:      Effort: Pulmonary effort is normal. No respiratory distress.      Breath sounds: No wheezing.   Skin:     General: Skin is warm.      Capillary Refill: Capillary refill takes less than 2 seconds.   Neurological:      Mental Status: She is alert and oriented to person, place, and time.   Psychiatric:         Behavior: Behavior normal.         Thought Content: Thought content normal.         Left lower extremity: No edema .CFT immediate to distal digit.  Negative Homans.  Hallux is rectus.  Absent range of motion the first MTP without pain.  Muscle strength within normal limits.    Procedures        ASSESSMENT AND PLAN    Diagnoses and all orders for this visit:    1. Bunion of left foot (Primary)  -     XR Foot 3+ View Left        -Patient doing very well.  Radiographs taken and reviewed.  Activity as " tolerated without restriction.  Recheck as needed            This document has been electronically signed by Chaitanya Jimenes DPM on June 30, 2021 10:18 CDT     6/30/2021  10:18 CDT

## 2021-07-13 ENCOUNTER — OFFICE VISIT (OUTPATIENT)
Dept: FAMILY MEDICINE CLINIC | Facility: CLINIC | Age: 56
End: 2021-07-13

## 2021-07-13 VITALS
HEART RATE: 68 BPM | SYSTOLIC BLOOD PRESSURE: 132 MMHG | OXYGEN SATURATION: 96 % | BODY MASS INDEX: 36.5 KG/M2 | HEIGHT: 63 IN | DIASTOLIC BLOOD PRESSURE: 84 MMHG | WEIGHT: 206 LBS

## 2021-07-13 DIAGNOSIS — M54.2 NECK PAIN: Primary | ICD-10-CM

## 2021-07-13 DIAGNOSIS — H61.22 IMPACTED CERUMEN OF LEFT EAR: ICD-10-CM

## 2021-07-13 PROCEDURE — 99213 OFFICE O/P EST LOW 20 MIN: CPT | Performed by: NURSE PRACTITIONER

## 2021-07-13 RX ORDER — IBUPROFEN 800 MG/1
800 TABLET ORAL EVERY 8 HOURS PRN
Qty: 90 TABLET | Refills: 1 | Status: SHIPPED | OUTPATIENT
Start: 2021-07-13 | End: 2021-11-10

## 2021-07-13 NOTE — PROGRESS NOTES
"CC: Generalized Joint Pain (neck mostly, aching )      Subjective:  Jazmin Jennings is a 56 y.o. female who presents for     Neck Pain   This is a chronic problem. The current episode started more than 1 year ago. The problem occurs constantly. The problem has been unchanged. The pain is associated with nothing. The pain is present in the right side. The quality of the pain is described as aching. The pain is at a severity of 3/10. The pain is mild. The symptoms are aggravated by position. Stiffness is present in the morning. Pertinent negatives include no chest pain, fever, headaches, numbness, pain with swallowing, paresis, photophobia, syncope, tingling, trouble swallowing, visual change or weakness. She has tried ice and heat for the symptoms. The treatment provided moderate relief.       The following portions of the patient's history were reviewed and updated as appropriate: allergies, current medications, past family history, past medical history, past social history, past surgical history and problem list.    Past Medical History:   Diagnosis Date   • Arthritis    • Bunion    • GERD (gastroesophageal reflux disease)    • Ingrown toenail    • PONV (postoperative nausea and vomiting)          Current Outpatient Medications:   •  ibuprofen (ADVIL,MOTRIN) 800 MG tablet, Take 1 tablet by mouth Every 8 (Eight) Hours As Needed for Mild Pain , Moderate Pain  or Headache., Disp: 90 tablet, Rfl: 1    Review of Systems    Review of Systems   Constitutional: Negative for fever.   HENT: Negative for trouble swallowing.    Eyes: Negative for photophobia.   Cardiovascular: Negative for chest pain and syncope.   Musculoskeletal: Positive for neck pain.   Neurological: Negative for tingling, weakness, numbness and headaches.       Objective  Vitals:    07/13/21 0828   BP: 132/84   Pulse: 68   SpO2: 96%   Weight: 93.4 kg (206 lb)   Height: 160 cm (63\")     Body mass index is 36.49 kg/m².    Physical Exam    Physical " Exam  Vitals and nursing note reviewed.   Constitutional:       General: She is not in acute distress.     Appearance: Normal appearance. She is well-developed. She is not ill-appearing, toxic-appearing or diaphoretic.   HENT:      Head: Normocephalic and atraumatic.      Right Ear: Tympanic membrane, ear canal and external ear normal. There is no impacted cerumen.      Left Ear: There is impacted cerumen.   Eyes:      General: No scleral icterus.        Right eye: No discharge.         Left eye: No discharge.      Extraocular Movements: Extraocular movements intact.      Conjunctiva/sclera: Conjunctivae normal.   Neck:      Vascular: No carotid bruit.   Cardiovascular:      Rate and Rhythm: Normal rate and regular rhythm.      Pulses: Normal pulses.      Heart sounds: Normal heart sounds. No murmur heard.   No friction rub. No gallop.    Pulmonary:      Effort: Pulmonary effort is normal. No respiratory distress.      Breath sounds: Normal breath sounds. No stridor. No wheezing, rhonchi or rales.   Chest:      Chest wall: No tenderness.   Abdominal:      General: Bowel sounds are normal. There is no distension.      Palpations: Abdomen is soft. There is no mass.      Tenderness: There is no abdominal tenderness. There is no guarding or rebound.      Hernia: No hernia is present.   Musculoskeletal:         General: Tenderness present. Normal range of motion.      Cervical back: Normal range of motion and neck supple. No rigidity. No muscular tenderness.      Right lower leg: No edema.      Left lower leg: No edema.      Comments: There is a lot of muscle tension on the rt side of the neck. Tenderness on palpation. Good ROM of the cervical spine.   Lymphadenopathy:      Cervical: No cervical adenopathy.   Skin:     General: Skin is warm and dry.      Capillary Refill: Capillary refill takes less than 2 seconds.      Coloration: Skin is not jaundiced or pale.      Findings: No bruising, erythema, lesion or rash.    Neurological:      General: No focal deficit present.      Mental Status: She is alert and oriented to person, place, and time. Mental status is at baseline.   Psychiatric:         Mood and Affect: Mood normal.         Behavior: Behavior normal.         Thought Content: Thought content normal.         Judgment: Judgment normal.             Diagnoses and all orders for this visit:    1. Neck pain (Primary)  -     ibuprofen (ADVIL,MOTRIN) 800 MG tablet; Take 1 tablet by mouth Every 8 (Eight) Hours As Needed for Mild Pain , Moderate Pain  or Headache.  Dispense: 90 tablet; Refill: 1    2. Impacted cerumen of left ear       Will treat the neck pain with ibuprofen 800 mg 1 p.o. every 8 hours as needed for pain.  Did offer patient a muscle relaxer to help with the muscle tension, she declines this today.  Reviewed x-ray results with patient.  Patient states she is going to get a copy of this x-ray and take with her to the chiropractor.  For the impacted cerumen, advised to obtain Colace stool softeners and to poke a hole into the gelcap.  Then, instill 3 to 4 drops 3-4 times per week in the affected ear.  This will help to loosen the cerumen.  Patient is to follow-up in 3 months or sooner if needed.  At that time we will perform a complete exam with labs.  Answered all questions.  Patient verbalized understanding of plan of care.      This document has been electronically signed by FATIMAH Schaefer on July 13, 2021 08:47 CDT

## 2021-07-15 ENCOUNTER — CLINICAL SUPPORT (OUTPATIENT)
Dept: FAMILY MEDICINE CLINIC | Facility: CLINIC | Age: 56
End: 2021-07-15

## 2021-07-15 DIAGNOSIS — H61.22 IMPACTED CERUMEN OF LEFT EAR: ICD-10-CM

## 2021-07-15 PROCEDURE — 69210 REMOVE IMPACTED EAR WAX UNI: CPT | Performed by: NURSE PRACTITIONER

## 2021-11-10 ENCOUNTER — OFFICE VISIT (OUTPATIENT)
Dept: FAMILY MEDICINE CLINIC | Facility: CLINIC | Age: 56
End: 2021-11-10

## 2021-11-10 DIAGNOSIS — R42 DIZZINESS: ICD-10-CM

## 2021-11-10 DIAGNOSIS — H92.02 ACUTE OTALGIA, LEFT: Primary | ICD-10-CM

## 2021-11-10 DIAGNOSIS — J01.00 ACUTE MAXILLARY SINUSITIS, RECURRENCE NOT SPECIFIED: ICD-10-CM

## 2021-11-10 PROCEDURE — 99441 PR PHYS/QHP TELEPHONE EVALUATION 5-10 MIN: CPT | Performed by: NURSE PRACTITIONER

## 2021-11-10 RX ORDER — FLUTICASONE PROPIONATE 50 MCG
2 SPRAY, SUSPENSION (ML) NASAL DAILY
Qty: 9.9 ML | Refills: 3 | Status: SHIPPED | OUTPATIENT
Start: 2021-11-10 | End: 2021-11-18

## 2021-11-10 RX ORDER — AMOXICILLIN 500 MG/1
500 CAPSULE ORAL 2 TIMES DAILY
Qty: 20 CAPSULE | Refills: 0 | Status: SHIPPED | OUTPATIENT
Start: 2021-11-10 | End: 2021-11-18

## 2021-11-10 RX ORDER — METHYLPREDNISOLONE 4 MG/1
TABLET ORAL
Qty: 21 TABLET | Refills: 0 | Status: SHIPPED | OUTPATIENT
Start: 2021-11-10 | End: 2021-11-18

## 2021-11-10 NOTE — PROGRESS NOTES
CC: Earache (left ear, all clogged up, dizzy, x2-3 days ) and Sinusitis      Subjective:  Jazmin Jennings is a 56 y.o. female who presents for     This is a telephone visit because of pandemic.    Earache   There is pain in the left ear. This is a new problem. The current episode started yesterday. The problem occurs constantly. The problem has been unchanged. There has been no fever. The pain is at a severity of 2/10. The pain is mild. Associated symptoms include headaches, neck pain and rhinorrhea. Pertinent negatives include no abdominal pain, coughing, diarrhea, ear discharge, hearing loss, rash, sore throat or vomiting. Associated symptoms comments: +Dizziness. Treatments tried: Decongestant. The treatment provided no relief.   Sinus Problem  This is a new problem. The current episode started in the past 7 days. The problem is unchanged. There has been no fever. Her pain is at a severity of 0/10. She is experiencing no pain. Associated symptoms include congestion, ear pain, headaches, neck pain and sinus pressure. Pertinent negatives include no chills, coughing, diaphoresis, shortness of breath, sneezing or sore throat. Past treatments include oral decongestants. The treatment provided no relief.       The following portions of the patient's history were reviewed and updated as appropriate: allergies, current medications, past family history, past medical history, past social history, past surgical history and problem list.    Past Medical History:   Diagnosis Date   • Arthritis    • Bunion    • GERD (gastroesophageal reflux disease)    • Ingrown toenail    • PONV (postoperative nausea and vomiting)          Current Outpatient Medications:   •  amoxicillin (AMOXIL) 500 MG capsule, Take 1 capsule by mouth 2 (Two) Times a Day for 10 days., Disp: 20 capsule, Rfl: 0  •  fluticasone (Flonase) 50 MCG/ACT nasal spray, 2 sprays into the nostril(s) as directed by provider Daily., Disp: 9.9 mL, Rfl: 3  •   methylPREDNISolone (MEDROL) 4 MG dose pack, Take as directed on package instructions., Disp: 21 tablet, Rfl: 0    Review of Systems    Review of Systems   Constitutional: Negative.  Negative for chills and diaphoresis.   HENT: Positive for congestion, ear pain, rhinorrhea and sinus pressure. Negative for ear discharge, hearing loss, sneezing and sore throat.    Eyes: Negative.    Respiratory: Negative.  Negative for cough and shortness of breath.    Cardiovascular: Negative.    Gastrointestinal: Negative.  Negative for abdominal pain, diarrhea and vomiting.   Endocrine: Negative.    Genitourinary: Negative.    Musculoskeletal: Positive for neck pain.   Skin: Negative.  Negative for rash.   Allergic/Immunologic: Negative.    Neurological: Positive for headaches.   Hematological: Negative.    Psychiatric/Behavioral: Negative.    All other systems reviewed and are negative.      Objective  There were no vitals filed for this visit.  There is no height or weight on file to calculate BMI.    Physical Exam    Physical Exam  Deferred, telephone visit because of pandemic      Diagnoses and all orders for this visit:    1. Acute otalgia, left (Primary)  -     amoxicillin (AMOXIL) 500 MG capsule; Take 1 capsule by mouth 2 (Two) Times a Day for 10 days.  Dispense: 20 capsule; Refill: 0  -     fluticasone (Flonase) 50 MCG/ACT nasal spray; 2 sprays into the nostril(s) as directed by provider Daily.  Dispense: 9.9 mL; Refill: 3  -     methylPREDNISolone (MEDROL) 4 MG dose pack; Take as directed on package instructions.  Dispense: 21 tablet; Refill: 0    2. Dizziness  -     fluticasone (Flonase) 50 MCG/ACT nasal spray; 2 sprays into the nostril(s) as directed by provider Daily.  Dispense: 9.9 mL; Refill: 3  -     methylPREDNISolone (MEDROL) 4 MG dose pack; Take as directed on package instructions.  Dispense: 21 tablet; Refill: 0    3. Acute maxillary sinusitis, recurrence not specified  -     amoxicillin (AMOXIL) 500 MG capsule;  Take 1 capsule by mouth 2 (Two) Times a Day for 10 days.  Dispense: 20 capsule; Refill: 0  -     fluticasone (Flonase) 50 MCG/ACT nasal spray; 2 sprays into the nostril(s) as directed by provider Daily.  Dispense: 9.9 mL; Refill: 3  -     methylPREDNISolone (MEDROL) 4 MG dose pack; Take as directed on package instructions.  Dispense: 21 tablet; Refill: 0         With acute otalgia of the left ear, dizziness, and acute maxillary sinusitis.  Will treat symptoms with amoxicillin 500 mg 1 p.o. twice daily for 10 days, Flonase nasal spray 2 sprays each nostril daily, and a Medrol Dosepak.  Patient instructed on how to take medications and possible side effects of each.  Advised to increase water intake.  Advised Tylenol or ibuprofen if needed.  Patient was advised that she is due for checkup with labs.  She states she will call and get this scheduled.  Otherwise, patient to follow-up as needed.  Answered all questions.  Patient verbalized understanding of plan of care.        This document has been electronically signed by FATIMAH Schaefer on November 10, 2021 10:07 CST     You have chosen to receive care through a telephone visit. Do you consent to use a telephone visit for your medical care today? Yes  This visit has been rescheduled as a phone visit to comply with patient safety concerns in accordance with CDC recommendations. Total time of discussion was 9 minutes.

## 2021-11-10 NOTE — PATIENT INSTRUCTIONS
Sinusitis, Adult  Sinusitis is soreness and swelling (inflammation) of your sinuses. Sinuses are hollow spaces in the bones around your face. They are located:  · Around your eyes.  · In the middle of your forehead.  · Behind your nose.  · In your cheekbones.  Your sinuses and nasal passages are lined with a fluid called mucus. Mucus drains out of your sinuses. Swelling can trap mucus in your sinuses. This lets germs (bacteria, virus, or fungus) grow, which leads to infection. Most of the time, this condition is caused by a virus.  What are the causes?  This condition is caused by:  · Allergies.  · Asthma.  · Germs.  · Things that block your nose or sinuses.  · Growths in the nose (nasal polyps).  · Chemicals or irritants in the air.  · Fungus (rare).  What increases the risk?  You are more likely to develop this condition if:  · You have a weak body defense system (immune system).  · You do a lot of swimming or diving.  · You use nasal sprays too much.  · You smoke.  What are the signs or symptoms?  The main symptoms of this condition are pain and a feeling of pressure around the sinuses. Other symptoms include:  · Stuffy nose (congestion).  · Runny nose (drainage).  · Swelling and warmth in the sinuses.  · Headache.  · Toothache.  · A cough that may get worse at night.  · Mucus that collects in the throat or the back of the nose (postnasal drip).  · Being unable to smell and taste.  · Being very tired (fatigue).  · A fever.  · Sore throat.  · Bad breath.  How is this diagnosed?  This condition is diagnosed based on:  · Your symptoms.  · Your medical history.  · A physical exam.  · Tests to find out if your condition is short-term (acute) or long-term (chronic). Your doctor may:  ? Check your nose for growths (polyps).  ? Check your sinuses using a tool that has a light (endoscope).  ? Check for allergies or germs.  ? Do imaging tests, such as an MRI or CT scan.  How is this treated?  Treatment for this condition  depends on the cause and whether it is short-term or long-term.  · If caused by a virus, your symptoms should go away on their own within 10 days. You may be given medicines to relieve symptoms. They include:  ? Medicines that shrink swollen tissue in the nose.  ? Medicines that treat allergies (antihistamines).  ? A spray that treats swelling of the nostrils.   ? Rinses that help get rid of thick mucus in your nose (nasal saline washes).  · If caused by bacteria, your doctor may wait to see if you will get better without treatment. You may be given antibiotic medicine if you have:  ? A very bad infection.  ? A weak body defense system.  · If caused by growths in the nose, you may need to have surgery.  Follow these instructions at home:  Medicines  · Take, use, or apply over-the-counter and prescription medicines only as told by your doctor. These may include nasal sprays.  · If you were prescribed an antibiotic medicine, take it as told by your doctor. Do not stop taking the antibiotic even if you start to feel better.  Hydrate and humidify    · Drink enough water to keep your pee (urine) pale yellow.  · Use a cool mist humidifier to keep the humidity level in your home above 50%.  · Breathe in steam for 10-15 minutes, 3-4 times a day, or as told by your doctor. You can do this in the bathroom while a hot shower is running.  · Try not to spend time in cool or dry air.    Rest  · Rest as much as you can.  · Sleep with your head raised (elevated).  · Make sure you get enough sleep each night.  General instructions    · Put a warm, moist washcloth on your face 3-4 times a day, or as often as told by your doctor. This will help with discomfort.  · Wash your hands often with soap and water. If there is no soap and water, use hand .  · Do not smoke. Avoid being around people who are smoking (secondhand smoke).  · Keep all follow-up visits as told by your doctor. This is important.    Contact a doctor if:  · You  have a fever.  · Your symptoms get worse.  · Your symptoms do not get better within 10 days.  Get help right away if:  · You have a very bad headache.  · You cannot stop throwing up (vomiting).  · You have very bad pain or swelling around your face or eyes.  · You have trouble seeing.  · You feel confused.  · Your neck is stiff.  · You have trouble breathing.  Summary  · Sinusitis is swelling of your sinuses. Sinuses are hollow spaces in the bones around your face.  · This condition is caused by tissues in your nose that become inflamed or swollen. This traps germs. These can lead to infection.  · If you were prescribed an antibiotic medicine, take it as told by your doctor. Do not stop taking it even if you start to feel better.  · Keep all follow-up visits as told by your doctor. This is important.  This information is not intended to replace advice given to you by your health care provider. Make sure you discuss any questions you have with your health care provider.  Document Revised: 05/20/2019 Document Reviewed: 05/20/2019  [employer] Patient Education © 2021 [employer]

## 2021-11-18 ENCOUNTER — OFFICE VISIT (OUTPATIENT)
Dept: FAMILY MEDICINE CLINIC | Facility: CLINIC | Age: 56
End: 2021-11-18

## 2021-11-18 VITALS
HEIGHT: 63 IN | DIASTOLIC BLOOD PRESSURE: 82 MMHG | BODY MASS INDEX: 35.79 KG/M2 | SYSTOLIC BLOOD PRESSURE: 124 MMHG | WEIGHT: 202 LBS | HEART RATE: 76 BPM | OXYGEN SATURATION: 98 %

## 2021-11-18 DIAGNOSIS — R42 VERTIGO: Primary | ICD-10-CM

## 2021-11-18 PROCEDURE — 99213 OFFICE O/P EST LOW 20 MIN: CPT | Performed by: NURSE PRACTITIONER

## 2021-11-18 RX ORDER — MECLIZINE HCL 12.5 MG/1
12.5 TABLET ORAL 3 TIMES DAILY PRN
Qty: 30 TABLET | Refills: 3 | Status: SHIPPED | OUTPATIENT
Start: 2021-11-18

## 2021-11-18 NOTE — PROGRESS NOTES
"CC: Dizziness (a little better, yesterday was worse)      Subjective:  Jazmin Jennings is a 56 y.o. female who presents for     Patient presents to office with complaints of still being dizzy.  She is a telephone visit on 11/10/2020 with ear pain and some dizziness.  She was treated with amoxicillin, Flonase, and a Medrol Dosepak.  States that your pain improved but she continues to be dizzy. Concerned that they might have mold in her home that could be causing symptoms.     Dizziness  This is a new problem. The current episode started 1 to 4 weeks ago. The problem occurs constantly. The problem has been waxing and waning. Associated symptoms include vertigo. Pertinent negatives include no abdominal pain, anorexia, change in bowel habit, chills, congestion, coughing, fever, nausea or vomiting. Nothing aggravates the symptoms. She has tried nothing for the symptoms. The treatment provided no relief.       The following portions of the patient's history were reviewed and updated as appropriate: allergies, current medications, past family history, past medical history, past social history, past surgical history and problem list.    Past Medical History:   Diagnosis Date   • Arthritis    • Bunion    • GERD (gastroesophageal reflux disease)    • Ingrown toenail    • PONV (postoperative nausea and vomiting)          Current Outpatient Medications:   •  meclizine (ANTIVERT) 12.5 MG tablet, Take 1 tablet by mouth 3 (Three) Times a Day As Needed for Dizziness., Disp: 30 tablet, Rfl: 3    Review of Systems    Review of Systems   Constitutional: Negative for chills and fever.   HENT: Negative for congestion.    Respiratory: Negative for cough.    Gastrointestinal: Negative for abdominal pain, anorexia, change in bowel habit, nausea and vomiting.   Neurological: Positive for dizziness and vertigo.       Objective  Vitals:    11/18/21 1025   BP: 124/82   Pulse: 76   SpO2: 98%   Weight: 91.6 kg (202 lb)   Height: 160 cm (63\") "     Body mass index is 35.78 kg/m².    Physical Exam    Physical Exam  Vitals and nursing note reviewed.   Constitutional:       General: She is not in acute distress.     Appearance: Normal appearance. She is well-developed. She is not ill-appearing, toxic-appearing or diaphoretic.   HENT:      Head: Normocephalic and atraumatic.      Right Ear: Tympanic membrane, ear canal and external ear normal. There is no impacted cerumen.      Left Ear: Tympanic membrane, ear canal and external ear normal. There is no impacted cerumen.      Nose: Nose normal.   Eyes:      General: No scleral icterus.        Right eye: No discharge.         Left eye: No discharge.      Extraocular Movements: Extraocular movements intact.      Conjunctiva/sclera: Conjunctivae normal.   Neck:      Vascular: No carotid bruit.   Cardiovascular:      Rate and Rhythm: Normal rate and regular rhythm.      Pulses: Normal pulses.      Heart sounds: Normal heart sounds. No murmur heard.  No friction rub. No gallop.    Pulmonary:      Effort: Pulmonary effort is normal. No respiratory distress.      Breath sounds: Normal breath sounds. No stridor. No wheezing, rhonchi or rales.   Chest:      Chest wall: No tenderness.   Abdominal:      General: Bowel sounds are normal. There is no distension.      Palpations: Abdomen is soft. There is no mass.      Tenderness: There is no abdominal tenderness. There is no guarding or rebound.      Hernia: No hernia is present.   Musculoskeletal:      Cervical back: Normal range of motion and neck supple. No rigidity or tenderness. No muscular tenderness.      Right lower leg: No edema.      Left lower leg: No edema.   Lymphadenopathy:      Cervical: No cervical adenopathy.   Skin:     General: Skin is warm and dry.      Capillary Refill: Capillary refill takes less than 2 seconds.      Coloration: Skin is not jaundiced or pale.      Findings: No bruising, erythema, lesion or rash.   Neurological:      General: No focal  deficit present.      Mental Status: She is alert and oriented to person, place, and time. Mental status is at baseline.   Psychiatric:         Mood and Affect: Mood normal.         Behavior: Behavior normal.         Thought Content: Thought content normal.         Judgment: Judgment normal.             Diagnoses and all orders for this visit:    1. Vertigo (Primary)  -     meclizine (ANTIVERT) 12.5 MG tablet; Take 1 tablet by mouth 3 (Three) Times a Day As Needed for Dizziness.  Dispense: 30 tablet; Refill: 3       Will treat patient's vertigo with meclizine 12.5 mg 1 p.o. 3 times daily as needed dizziness.  Patient instructed on how to take this medication and possible side effects.  Did advise if it makes her too sleepy she might break the pill in half.  Advised to follow-up as needed.  She does need to schedule routine checkup with labs.  She states she is going to schedule this on her way out today.  Patient declines flu shot today while in office.  Answered all questions.  Patient verbalized understanding of plan of care.          This document has been electronically signed by FATIMAH Schaefer on November 18, 2021 10:39 CST

## 2021-11-18 NOTE — PATIENT INSTRUCTIONS
Vertigo  Vertigo is the feeling that you or the things around you are moving when they are not. This feeling can come and go at any time. Vertigo often goes away on its own. This condition can be dangerous if it happens when you are doing activities like driving or working with machines.  Your doctor will do tests to find the cause of your vertigo. These tests will also help your doctor decide on the best treatment for you.  Follow these instructions at home:  Eating and drinking         · Drink enough fluid to keep your pee (urine) pale yellow.  · Do not drink alcohol.  Activity  · Return to your normal activities as told by your doctor. Ask your doctor what activities are safe for you.  · In the morning, first sit up on the side of the bed. When you feel okay, stand slowly while you hold onto something until you know that your balance is fine.  · Move slowly. Avoid sudden body or head movements or certain positions, as told by your doctor.  · Use a cane if you have trouble standing or walking.  · Sit down right away if you feel dizzy.  · Avoid doing any tasks or activities that can cause danger to you or others if you get dizzy.  · Avoid bending down if you feel dizzy. Place items in your home so that they are easy for you to reach without leaning over.  · Do not drive or use heavy machinery if you feel dizzy.  General instructions  · Take over-the-counter and prescription medicines only as told by your doctor.  · Keep all follow-up visits as told by your doctor. This is important.  Contact a doctor if:  · Your medicine does not help your vertigo.  · You have a fever.  · Your problems get worse or you have new symptoms.  · Your family or friends see changes in your behavior.  · The feeling of being sick to your stomach gets worse.  · Your vomiting gets worse.  · You lose feeling (have numbness) in part of your body.  · You feel prickling and tingling in a part of your body.  Get help right away if:  · You have  trouble moving or talking.  · You are always dizzy.  · You pass out (faint).  · You get very bad headaches.  · You feel weak in your hands, arms, or legs.  · You have changes in your hearing.  · You have changes in how you see (vision).  · You get a stiff neck.  · Bright light starts to bother you.  Summary  · Vertigo is the feeling that you or the things around you are moving when they are not.  · Your doctor will do tests to find the cause of your vertigo.  · You may be told to avoid some tasks, positions, or movements.  · Contact a doctor if your medicine is not helping, or if you have a fever, new symptoms, or a change in behavior.  · Get help right away if you get very bad headaches, or if you have changes in how you speak, hear, or see.  This information is not intended to replace advice given to you by your health care provider. Make sure you discuss any questions you have with your health care provider.  Document Revised: 11/11/2019 Document Reviewed: 11/11/2019  Elsevier Patient Education © 2021 [employer]

## 2024-01-22 NOTE — PROGRESS NOTES
Chief Complaint  Establish Care, Leg Injury (Tear in muscle), Headache, and Annual Exam    Subjective          Jazmin Jennings, 58 y.o. female presents to [facility]  History of Present Illness   As a new patient to establish care.  She is a previous patient of FATIMAH Bundy.    She states that about 2 weeks ago her right side of her face was swollen and they thought she may have mumps.  She states she was treated with antibiotics for 10 days.  She was also having headaches at the time but those are starting to get better.  She states her last headache was 2 days ago.  She states she does have occasional pains in her neck.  She states this all seems to have resolved now.    She states that last October she tore her right calf muscle.  She states she was doing physical therapy in October and it seems to be healed.  She states there is not any pain now.    She states she does get some nocturnal leg cramps and takes magnesium which does help.    She does complain of some decreased hearing in her left ear and thinks she has some earwax impacted.    She is due for an annual physical.    Discussed and reviewed:  Cardiovascular Disease Screening Tests    Colorectal Cancer Screening, Colonoscopy  Diabetes Screening-Lab Order for A1c.    Body mass index is 35.91 kg/m².  Screening Mammography   Screening Pap Tests, she is up-to-date on her Pap test.  We discussed Pap test are every 2 to 3 years.  Recommend routine dental and eye exams.   [facility] IMMUNIZATIONS: Tdap discussed and given today.  She declines the influenza, COVID and shingles vaccines.      PHQ-2 Depression Screening  Little interest or pleasure in doing things? 0-->not at all   Feeling down, depressed, or hopeless? 0-->not at all   PHQ-2 Total Score 0       Tobacco Use: Low Risk  (1/23/2024)    Patient History     Smoking Tobacco Use: Never     Smokeless Tobacco Use: Never     Passive Exposure: Not on file      Objective  "  Vital Signs:   /80   Pulse 71   Temp 97.2 °F (36.2 °C)   Ht 160 cm (63\")   Wt 91.9 kg (202 lb 11.2 oz)   SpO2 97%   BMI 35.91 kg/m²       Current Outpatient Medications:     magnesium chloride ER 64 MG DR tablet, Take  by mouth Daily., Disp: , Rfl:    Past Medical History:   Diagnosis Date    Arthritis     Bunion     GERD (gastroesophageal reflux disease)     Ingrown toenail     PONV (postoperative nausea and vomiting)       Physical Exam  Vitals reviewed.   Constitutional:       Appearance: Normal appearance. She is well-developed.   HENT:      Right Ear: Tympanic membrane, ear canal and external ear normal.      Left Ear: Hearing, tympanic membrane, ear canal and external ear normal. There is impacted cerumen.      Mouth/Throat:      Mouth: Mucous membranes are moist.      Pharynx: No pharyngeal swelling, oropharyngeal exudate or posterior oropharyngeal erythema.   Neck:      Thyroid: No thyroid mass, thyromegaly or thyroid tenderness.   Cardiovascular:      Rate and Rhythm: Normal rate and regular rhythm.      Heart sounds: No murmur heard.     No friction rub. No gallop.   Pulmonary:      Effort: Pulmonary effort is normal.      Breath sounds: Normal breath sounds. No wheezing or rhonchi.   Lymphadenopathy:      Cervical: No cervical adenopathy.   Skin:     General: Skin is warm and dry.   Neurological:      Mental Status: She is alert and oriented to person, place, and time.      Cranial Nerves: No cranial nerve deficit.   Psychiatric:         Mood and Affect: Mood and affect normal.         Behavior: Behavior normal.         Thought Content: Thought content normal. Thought content does not include homicidal or suicidal ideation.         Judgment: Judgment normal.        Result Review :   {The following data was reviewed by FATIMAH Beckman    Urinalysis:    Component  Ref Range & Units 09:22   Color  Yellow, Straw, Dark Yellow, Karen Yellow   Clarity, UA  Clear Clear   Specific " Gravity  1.005 - 1.030 1.010   pH, Urine  5.0 - 8.0 5.0   Leukocytes  Negative Negative   Nitrite, UA  Negative Negative   Protein, POC  Negative mg/dL Negative   Glucose, UA  Negative mg/dL Negative   Ketones, UA  Negative Negative   Urobilinogen, UA  Normal, 0.2 E.U./dL 0.2 E.U./dL   Bilirubin  Negative Negative   Blood, UA  Negative Negative            Component  Ref Range & Units 3 yr ago  [facility]   Glucose  70 - 99 mg/dL 103 High    BUN  7 - 23 mg/dL 16   Creatinine  0.52 - 1.04 mg/dL 0.84   Sodium  137 - 145 mmol/L 140   Potassium  3.4 - 5.0 mmol/L 4.3   Chloride  101 - 112 mmol/L 104   CO2  22.0 - 30.0 mmol/L 29.0   Calcium  8.4 - 10.2 mg/dL 9.2   Total Protein  6.3 - 8.6 g/dL 7.9   Albumin  3.50 - 5.00 g/dL 4.60   ALT (SGPT)  <=35 U/L 65 High    AST (SGOT)  14 - 36 U/L 39 High    Alkaline Phosphatase  38 - 126 U/L 58   Total Bilirubin  0.2 - 1.3 mg/dL 0.5   eGFR Non African Amer  51 - 120 mL/min/1.73 70        Component  Ref Range & Units 3 yr ago  [facility]   Total Cholesterol  0 - 200 mg/dL 243 High    Triglycerides  0 - 150 mg/dL 138   HDL Cholesterol  40 - 60 mg/dL 47   LDL Cholesterol  0 - 100 mg/dL 168 High    VLDL Cholesterol  5 - 40 mg/dL 27.6   LDL/HDL Ratio 3.58         Component  Ref Range & Units 3 yr ago  [facility]   Hepatitis C Ab  Non-Reactive Non-Reactive          Ear Cerumen Removal    Date/Time: 1/23/2024 8:43 AM    Performed by: Nicole Shelley MA  Authorized by: Maida Peoples APRN  Location details: left ear  Patient tolerance: patient tolerated the procedure well with no immediate complications  Procedure type: irrigation           Assessment and Plan    Diagnoses and all orders for this visit:    1. Annual physical exam (Primary)  Comments:  Health maintenance and prevention discussed today, see AVS.  Orders:  -     CBC Auto Differential  -     Comprehensive Metabolic Panel  -     Lipid Panel  -     TSH Rfx On Abnormal To Free T4  -     POCT urinalysis  dipstick, automated    2. Class 2 obesity with body mass index (BMI) of 35.0 to 35.9 in adult, unspecified obesity type, unspecified whether serious comorbidity present  Assessment & Plan:  Patient's (Body mass index is 35.91 kg/m².) indicates that they are obese (BMI >30) with health conditions that include none . Weight is newly identified. BMI  is above average; BMI management plan is completed. We discussed low calorie, low carb based diet program, portion control, increasing exercise, and Information on healthy weight added to patient's after visit summary.     Orders:  -     Hemoglobin A1c  -     Vitamin D,25-Hydroxy    3. Nocturnal leg cramps  Assessment & Plan:  Currently stable on over-the-counter magnesium.  We will check magnesium level today.    Orders:  -     Magnesium    4. Impacted cerumen of left ear  -     Ear Cerumen Removal    5. Encounter for screening mammogram for malignant neoplasm of breast  -     Mammo Screening Digital Tomosynthesis Bilateral With CAD    6. Screen for colon cancer  -     Cancel: Ambulatory Referral For Screening Colonoscopy  -     Ambulatory Referral For Screening Colonoscopy    7. Need for diphtheria-tetanus-pertussis (Tdap) vaccine  -     Tdap Vaccine => 6yo IM (BOOSTRIX)        Follow Up   Return in about 1 year (around 1/23/2025) for Annual physical.  Patient was given instructions and counseling regarding her condition or for health maintenance advice. Please see specific information pulled into the AVS if appropriate.     Parts of this note are electronic transcriptions/translations of spoken language to printed text using the Dragon Dictation system.      Maida Peoples, FATIMAH  01/23/2024

## 2024-01-23 ENCOUNTER — TELEPHONE (OUTPATIENT)
Dept: GASTROENTEROLOGY | Facility: CLINIC | Age: 59
End: 2024-01-23
Payer: OTHER GOVERNMENT

## 2024-01-23 ENCOUNTER — OFFICE VISIT (OUTPATIENT)
Dept: FAMILY MEDICINE CLINIC | Facility: CLINIC | Age: 59
End: 2024-01-23
Payer: OTHER GOVERNMENT

## 2024-01-23 VITALS
DIASTOLIC BLOOD PRESSURE: 80 MMHG | HEART RATE: 71 BPM | WEIGHT: 202.7 LBS | HEIGHT: 63 IN | OXYGEN SATURATION: 97 % | TEMPERATURE: 97.2 F | SYSTOLIC BLOOD PRESSURE: 132 MMHG | BODY MASS INDEX: 35.91 KG/M2

## 2024-01-23 DIAGNOSIS — G47.62 NOCTURNAL LEG CRAMPS: ICD-10-CM

## 2024-01-23 DIAGNOSIS — Z12.11 SCREEN FOR COLON CANCER: ICD-10-CM

## 2024-01-23 DIAGNOSIS — Z12.31 ENCOUNTER FOR SCREENING MAMMOGRAM FOR MALIGNANT NEOPLASM OF BREAST: ICD-10-CM

## 2024-01-23 DIAGNOSIS — E66.9 CLASS 2 OBESITY WITH BODY MASS INDEX (BMI) OF 35.0 TO 35.9 IN ADULT, UNSPECIFIED OBESITY TYPE, UNSPECIFIED WHETHER SERIOUS COMORBIDITY PRESENT: ICD-10-CM

## 2024-01-23 DIAGNOSIS — H61.22 IMPACTED CERUMEN OF LEFT EAR: ICD-10-CM

## 2024-01-23 DIAGNOSIS — E55.9 VITAMIN D DEFICIENCY: ICD-10-CM

## 2024-01-23 DIAGNOSIS — Z23 NEED FOR DIPHTHERIA-TETANUS-PERTUSSIS (TDAP) VACCINE: ICD-10-CM

## 2024-01-23 DIAGNOSIS — Z00.00 ANNUAL PHYSICAL EXAM: Primary | ICD-10-CM

## 2024-01-23 PROBLEM — E66.812 CLASS 2 OBESITY WITH BODY MASS INDEX (BMI) OF 35.0 TO 35.9 IN ADULT: Status: ACTIVE | Noted: 2024-01-23

## 2024-01-23 LAB
25(OH)D3 SERPL-MCNC: 27.2 NG/ML (ref 30–100)
ALBUMIN SERPL-MCNC: 4.6 G/DL (ref 3.5–5.2)
ALBUMIN/GLOB SERPL: 1.5 G/DL
ALP SERPL-CCNC: 63 U/L (ref 39–117)
ALT SERPL W P-5'-P-CCNC: 32 U/L (ref 1–33)
ANION GAP SERPL CALCULATED.3IONS-SCNC: 13.1 MMOL/L (ref 5–15)
AST SERPL-CCNC: 27 U/L (ref 1–32)
BASOPHILS # BLD AUTO: 0.09 10*3/MM3 (ref 0–0.2)
BASOPHILS NFR BLD AUTO: 1.5 % (ref 0–1.5)
BILIRUB BLD-MCNC: NEGATIVE MG/DL
BILIRUB SERPL-MCNC: 0.3 MG/DL (ref 0–1.2)
BUN SERPL-MCNC: 19 MG/DL (ref 8–23)
BUN/CREAT SERPL: 21.3 (ref 7–25)
CALCIUM SPEC-SCNC: 9.5 MG/DL (ref 8.6–10.5)
CHLORIDE SERPL-SCNC: 103 MMOL/L (ref 98–107)
CHOLEST SERPL-MCNC: 286 MG/DL (ref 0–200)
CLARITY, POC: CLEAR
CO2 SERPL-SCNC: 24.9 MMOL/L (ref 22–29)
COLOR UR: YELLOW
CREAT SERPL-MCNC: 0.89 MG/DL (ref 0.57–1)
DEPRECATED RDW RBC AUTO: 41.6 FL (ref 37–54)
EGFRCR SERPLBLD CKD-EPI 2021: 74.3 ML/MIN/1.73
EOSINOPHIL # BLD AUTO: 0.3 10*3/MM3 (ref 0–0.4)
EOSINOPHIL NFR BLD AUTO: 4.8 % (ref 0.3–6.2)
ERYTHROCYTE [DISTWIDTH] IN BLOOD BY AUTOMATED COUNT: 13.2 % (ref 12.3–15.4)
EXPIRATION DATE: NORMAL
GLOBULIN UR ELPH-MCNC: 3 GM/DL
GLUCOSE SERPL-MCNC: 98 MG/DL (ref 65–99)
GLUCOSE UR STRIP-MCNC: NEGATIVE MG/DL
HBA1C MFR BLD: 6 % (ref 4.8–5.6)
HCT VFR BLD AUTO: 40.9 % (ref 34–46.6)
HDLC SERPL-MCNC: 45 MG/DL (ref 40–60)
HGB BLD-MCNC: 14.1 G/DL (ref 12–15.9)
IMM GRANULOCYTES # BLD AUTO: 0.01 10*3/MM3 (ref 0–0.05)
IMM GRANULOCYTES NFR BLD AUTO: 0.2 % (ref 0–0.5)
KETONES UR QL: NEGATIVE
LDLC SERPL CALC-MCNC: 214 MG/DL (ref 0–100)
LDLC/HDLC SERPL: 4.7 {RATIO}
LEUKOCYTE EST, POC: NEGATIVE
LYMPHOCYTES # BLD AUTO: 2.71 10*3/MM3 (ref 0.7–3.1)
LYMPHOCYTES NFR BLD AUTO: 43.7 % (ref 19.6–45.3)
Lab: NORMAL
MAGNESIUM SERPL-MCNC: 2.3 MG/DL (ref 1.6–2.4)
MCH RBC QN AUTO: 29.7 PG (ref 26.6–33)
MCHC RBC AUTO-ENTMCNC: 34.5 G/DL (ref 31.5–35.7)
MCV RBC AUTO: 86.1 FL (ref 79–97)
MONOCYTES # BLD AUTO: 0.39 10*3/MM3 (ref 0.1–0.9)
MONOCYTES NFR BLD AUTO: 6.3 % (ref 5–12)
NEUTROPHILS NFR BLD AUTO: 2.7 10*3/MM3 (ref 1.7–7)
NEUTROPHILS NFR BLD AUTO: 43.5 % (ref 42.7–76)
NITRITE UR-MCNC: NEGATIVE MG/ML
NRBC BLD AUTO-RTO: 0 /100 WBC (ref 0–0.2)
PH UR: 5 [PH] (ref 5–8)
PLATELET # BLD AUTO: 287 10*3/MM3 (ref 140–450)
PMV BLD AUTO: 10.8 FL (ref 6–12)
POTASSIUM SERPL-SCNC: 4.5 MMOL/L (ref 3.5–5.2)
PROT SERPL-MCNC: 7.6 G/DL (ref 6–8.5)
PROT UR STRIP-MCNC: NEGATIVE MG/DL
RBC # BLD AUTO: 4.75 10*6/MM3 (ref 3.77–5.28)
RBC # UR STRIP: NEGATIVE /UL
SODIUM SERPL-SCNC: 141 MMOL/L (ref 136–145)
SP GR UR: 1.01 (ref 1–1.03)
TRIGL SERPL-MCNC: 147 MG/DL (ref 0–150)
TSH SERPL DL<=0.05 MIU/L-ACNC: 3.75 UIU/ML (ref 0.27–4.2)
UROBILINOGEN UR QL: NORMAL
VLDLC SERPL-MCNC: 27 MG/DL (ref 5–40)
WBC NRBC COR # BLD AUTO: 6.2 10*3/MM3 (ref 3.4–10.8)

## 2024-01-23 PROCEDURE — 80053 COMPREHEN METABOLIC PANEL: CPT | Performed by: NURSE PRACTITIONER

## 2024-01-23 PROCEDURE — 85025 COMPLETE CBC W/AUTO DIFF WBC: CPT | Performed by: NURSE PRACTITIONER

## 2024-01-23 PROCEDURE — 82306 VITAMIN D 25 HYDROXY: CPT | Performed by: NURSE PRACTITIONER

## 2024-01-23 PROCEDURE — 83735 ASSAY OF MAGNESIUM: CPT | Performed by: NURSE PRACTITIONER

## 2024-01-23 PROCEDURE — 99386 PREV VISIT NEW AGE 40-64: CPT | Performed by: NURSE PRACTITIONER

## 2024-01-23 PROCEDURE — 36415 COLL VENOUS BLD VENIPUNCTURE: CPT | Performed by: NURSE PRACTITIONER

## 2024-01-23 PROCEDURE — 81003 URINALYSIS AUTO W/O SCOPE: CPT | Performed by: NURSE PRACTITIONER

## 2024-01-23 PROCEDURE — 69209 REMOVE IMPACTED EAR WAX UNI: CPT | Performed by: NURSE PRACTITIONER

## 2024-01-23 PROCEDURE — 90715 TDAP VACCINE 7 YRS/> IM: CPT | Performed by: NURSE PRACTITIONER

## 2024-01-23 PROCEDURE — 99214 OFFICE O/P EST MOD 30 MIN: CPT | Performed by: NURSE PRACTITIONER

## 2024-01-23 PROCEDURE — 83036 HEMOGLOBIN GLYCOSYLATED A1C: CPT | Performed by: NURSE PRACTITIONER

## 2024-01-23 PROCEDURE — 90471 IMMUNIZATION ADMIN: CPT | Performed by: NURSE PRACTITIONER

## 2024-01-23 PROCEDURE — 80061 LIPID PANEL: CPT | Performed by: NURSE PRACTITIONER

## 2024-01-23 PROCEDURE — 84443 ASSAY THYROID STIM HORMONE: CPT | Performed by: NURSE PRACTITIONER

## 2024-01-23 RX ORDER — MAGNESIUM CHLORIDE 64 MG
TABLET, DELAYED RELEASE (ENTERIC COATED) ORAL DAILY
COMMUNITY

## 2024-01-23 NOTE — ASSESSMENT & PLAN NOTE
Patient's (Body mass index is 35.91 kg/m².) indicates that they are obese (BMI >30) with health conditions that include none . Weight is newly identified. BMI  is above average; BMI management plan is completed. We discussed low calorie, low carb based diet program, portion control, increasing exercise, and Information on healthy weight added to patient's after visit summary.

## 2024-01-23 NOTE — TELEPHONE ENCOUNTER
"Attempted to contact the patient in regards to the referral we received from Maida Peoples for \"Screen for colon cancer\". After several rings the phone prompted me to enter a remote access code so I was unable to leave a voicemail requesting a call back.   "

## 2024-01-23 NOTE — PATIENT INSTRUCTIONS
Exercising to Lose Weight  Getting regular exercise is important for everyone. It is especially important if you are overweight. Being overweight increases your risk of heart disease, stroke, diabetes, high blood pressure, and several types of cancer. Exercising, and reducing the calories you consume, can help you lose weight and improve fitness and health.  Exercise can be moderate or vigorous intensity. To lose weight, most people need to do a certain amount of moderate or vigorous-intensity exercise each week.  How can exercise affect me?  You lose weight when you exercise enough to burn more calories than you eat. Exercise also reduces body fat and builds muscle. The more muscle you have, the more calories you burn. Exercise also:  Improves mood.  Reduces stress and tension.  Improves your overall fitness, flexibility, and endurance.  Increases bone strength.  Moderate-intensity exercise    Moderate-intensity exercise is any activity that gets you moving enough to burn at least three times more energy (calories) than if you were sitting.  Examples of moderate exercise include:  Walking a mile in 15 minutes.  Doing light yard work.  Biking at an easy pace.  Most people should get at least 150 minutes of moderate-intensity exercise a week to maintain their body weight.  Vigorous-intensity exercise  Vigorous-intensity exercise is any activity that gets you moving enough to burn at least six times more calories than if you were sitting. When you exercise at this intensity, you should be working hard enough that you are not able to carry on a conversation.  Examples of vigorous exercise include:  Running.  Playing a team sport, such as football, basketball, and soccer.  Jumping rope.  Most people should get at least 75 minutes a week of vigorous exercise to maintain their body weight.  What actions can I take to lose weight?  The amount of exercise you need to lose weight depends on:  Your age.  The type of  exercise.  Any health conditions you have.  Your overall physical ability.  Talk to your health care provider about how much exercise you need and what types of activities are safe for you.  Nutrition    Make changes to your diet as told by your health care provider or diet and nutrition specialist (dietitian). This may include:  Eating fewer calories.  Eating more protein.  Eating less unhealthy fats.  Eating a diet that includes fresh fruits and vegetables, whole grains, low-fat dairy products, and lean protein.  Avoiding foods with added fat, salt, and sugar.  Drink plenty of water while you exercise to prevent dehydration or heat stroke.  Activity  Choose an activity that you enjoy and set realistic goals. Your health care provider can help you make an exercise plan that works for you.  Exercise at a moderate or vigorous intensity most days of the week.  The intensity of exercise may vary from person to person. You can tell how intense a workout is for you by paying attention to your breathing and heartbeat. Most people will notice their breathing and heartbeat get faster with more intense exercise.  Do resistance training twice each week, such as:  Push-ups.  Sit-ups.  Lifting weights.  Using resistance bands.  Getting short amounts of exercise can be just as helpful as long, structured periods of exercise. If you have trouble finding time to exercise, try doing these things as part of your daily routine:  Get up, stretch, and walk around every 30 minutes throughout the day.  Go for a walk during your lunch break.  Park your car farther away from your destination.  If you take public transportation, get off one stop early and walk the rest of the way.  Make phone calls while standing up and walking around.  Take the stairs instead of elevators or escalators.  Wear comfortable clothes and shoes with good support.  Do not exercise so much that you hurt yourself, feel dizzy, or get very short of breath.  Where to  find more information  [place] Department of Health and Human Services: www.hhs.gov  Centers for Disease Control and Prevention: www.cdc.gov  Contact a health care provider:  Before starting a new exercise program.  If you have questions or concerns about your weight.  If you have a medical problem that keeps you from exercising.  Get help right away if:  You have any of the following while exercising:  Injury.  Dizziness.  Difficulty breathing or shortness of breath that does not go away when you stop exercising.  Chest pain.  Rapid heartbeat.  These symptoms may represent a serious problem that is an emergency. Do not wait to see if the symptoms will go away. Get medical help right away. Call your local emergency services (911 in the [place] Do not drive yourself to the hospital.  Summary  Getting regular exercise is especially important if you are overweight.  Being overweight increases your risk of heart disease, stroke, diabetes, high blood pressure, and several types of cancer.  Losing weight happens when you burn more calories than you eat.  Reducing the amount of calories you eat, and getting regular moderate or vigorous exercise each week, helps you lose weight.  This information is not intended to replace advice given to you by your health care provider. Make sure you discuss any questions you have with your health care provider.  Document Revised: 02/13/2022 Document Reviewed: 02/13/2022  [employer] Patient Education © 2023 [employer]    MyPlate from [employer]    MyPlate is an outline of a general healthy diet based on the Dietary Guidelines for Americans, 4618-6644, from the U.S. Department of Agriculture (USDA). It sets guidelines for how much food you should eat from each food group based on your age, sex, and level of physical activity.  What are tips for following MyPlate?  To follow MyPlate recommendations:  Eat a wide variety of fruits and vegetables, grains, and protein foods.  Serve smaller portions and  eat less food throughout the day.  Limit portion sizes to avoid overeating.  Enjoy your food.  Get at least 150 minutes of exercise every week. This is about 30 minutes each day, 5 or more days per week.  It can be difficult to have every meal look like MyPlate. Think about MyPlate as eating guidelines for an entire day, rather than each individual meal.  Fruits and vegetables  Make one half of your plate fruits and vegetables.  Eat many different colors of fruits and vegetables each day.  For a 2,000-calorie daily food plan, eat:  2½ cups of vegetables every day.  2 cups of fruit every day.  1 cup is equal to:  1 cup raw or cooked vegetables.  1 cup raw fruit.  1 medium-sized orange, apple, or banana.  1 cup 100% fruit or vegetable juice.  2 cups raw leafy greens, such as lettuce, spinach, or kale.  ½ cup dried fruit.  Grains  One fourth of your plate should be grains.  Make at least half of the grains you eat each day whole grains.  For a 2,000-calorie daily food plan, eat 6 oz of grains every day.  1 oz is equal to:  1 slice bread.  1 cup cereal.  ½ cup cooked rice, cereal, or pasta.  Protein  One fourth of your plate should be protein.  Eat a wide variety of protein foods, including meat, poultry, fish, eggs, beans, nuts, and tofu.  For a 2,000-calorie daily food plan, eat 5½ oz of protein every day.  1 oz is equal to:  1 oz meat, poultry, or fish.  ¼ cup cooked beans.  1 egg.  ½ oz nuts or seeds.  1 Tbsp peanut butter.  Dairy  Drink fat-free or low-fat (1%) milk.  Eat or drink dairy as a side to meals.  For a 2,000-calorie daily food plan, eat or drink 3 cups of dairy every day.  1 cup is equal to:  1 cup milk, yogurt, cottage cheese, or soy milk (soy beverage).  2 oz processed cheese.  1½ oz natural cheese.  Fats, oils, salt, and sugars  Only small amounts of oils are recommended.  Avoid foods that are high in calories and low in nutritional value (empty calories), like foods high in fat or added  sugars.  Choose foods that are low in salt (sodium). Choose foods that have less than 140 milligrams (mg) of sodium per serving.  Drink water instead of sugary drinks. Drink enough fluid to keep your urine pale yellow.  Where to find support  Work with your health care provider or a dietitian to develop a customized eating plan that is right for you.  Download an curt (mobile application) to help you track your daily food intake.  Where to find more information  USDA: ChooseMyPlate.gov  Summary  MyPlate is a general guideline for healthy eating from the USDA. It is based on the Dietary Guidelines for Americans, 9279-9798.  In general, fruits and vegetables should take up one half of your plate, grains should take up one fourth of your plate, and protein should take up one fourth of your plate.  This information is not intended to replace advice given to you by your health care provider. Make sure you discuss any questions you have with your health care provider.  Document Revised: 11/08/2021 Document Reviewed: 11/08/2021  [employer] Patient Education © 2023 [employer]

## 2024-01-25 NOTE — TELEPHONE ENCOUNTER
"Attempted to contact the patient in regards to the referral we received from Maida Peoples for \"Screen for colon cancer\". No one answer. I couldn't leave a voice message, no voice mail. This is the second attempt in trying contact patient.  "

## 2024-01-29 NOTE — TELEPHONE ENCOUNTER
Spoke with patient in regards to a referral for a colon screening. Patient schedule a phone appointment on 3/20/2024@2:00.

## 2024-02-13 ENCOUNTER — TELEPHONE (OUTPATIENT)
Dept: FAMILY MEDICINE CLINIC | Facility: CLINIC | Age: 59
End: 2024-02-13

## 2024-02-13 NOTE — TELEPHONE ENCOUNTER
There is already a referral in for the colonoscopy, it may need to be worked?  I do not know who Dr. Smalls is?

## 2024-02-13 NOTE — TELEPHONE ENCOUNTER
Caller: Jazmin Jennings    Relationship: Self    Best call back number: [phone]    What is the medical concern/diagnosis: N/A    What specialty or service is being requested: GASTROENTEROLOGY    What is the provider, practice or medical service name: N/A    What is the office location: N/A    What is the office phone number: N/A    Any additional details: PATIENT NEEDS A REFERRAL FOR GASTROENTEROLOGY. SHE IS SCHEDULED FOR 03/20/2024 WITH DR FERNANDEZ   SHE ALSO RECEIVED A CALL FROM THE OFFICE OF DR PHILLIP ABOUT AN APPT ON FRIDAY 02/16/2024 AND WERE ASKING ABOUT THE REFERRAL. SHE IS NOT SURE IF DR FERNANDEZ OR DR PHILLIP IS THE DR SHE IS SUPPOSED TO SEE. PLEASE CALL PATIENT BACK AND SCHEDULE/ADVISE. PATIENT WOULD LIKE TO KNOW WHO IS ON FIRST.

## 2024-02-27 ENCOUNTER — HOSPITAL ENCOUNTER (OUTPATIENT)
Dept: MAMMOGRAPHY | Facility: HOSPITAL | Age: 59
Discharge: HOME | End: 2024-02-27
Admitting: NURSE PRACTITIONER
Payer: OTHER GOVERNMENT

## 2024-02-27 PROCEDURE — 77063 BREAST TOMOSYNTHESIS BI: CPT

## 2024-02-27 PROCEDURE — 77067 SCR MAMMO BI INCL CAD: CPT

## 2024-03-20 ENCOUNTER — CLINICAL SUPPORT (OUTPATIENT)
Dept: GASTROENTEROLOGY | Facility: CLINIC | Age: 59
End: 2024-03-20
Payer: OTHER GOVERNMENT

## 2024-03-20 ENCOUNTER — PREP FOR SURGERY (OUTPATIENT)
Dept: OTHER | Facility: HOSPITAL | Age: 59
End: 2024-03-20
Payer: OTHER GOVERNMENT

## 2024-03-20 DIAGNOSIS — Z12.11 COLON CANCER SCREENING: Primary | ICD-10-CM

## 2024-03-20 RX ORDER — SODIUM, POTASSIUM,MAG SULFATES 17.5-3.13G
SOLUTION, RECONSTITUTED, ORAL ORAL
Qty: 354 ML | Refills: 0 | Status: SHIPPED | OUTPATIENT
Start: 2024-03-20

## 2024-03-20 NOTE — PROGRESS NOTES
Jazmin Nataliia Duty  1965  58 y.o.    Reason for call: Screening Colonoscopy  Prep prescribed: Suprep  Prep instructions reviewed with patient and sent to patient via [employer]  Is the patient currently on any injectable medications for weight loss or diabetes? No  Clearance needed? No  If yes, what clearance is needed? N/A  Clearance has been requested from n/a  The patient has been scheduled for: Colonoscopy  After your procedure, you will be contacted with results. Please confirm the best phone # to reach the patient: [phone]  Family history of colon cancer? No  If yes, indicate relative: n/a  Family History   Problem Relation Age of Onset    Heart disease Mother     Cancer Father     Cancer Paternal Aunt      Past Medical History:   Diagnosis Date    Arthritis     Bunion     GERD (gastroesophageal reflux disease)     Ingrown toenail     PONV (postoperative nausea and vomiting)      No Known Allergies  Past Surgical History:   Procedure Laterality Date     SECTION      OVARIAN CYST REMOVAL      TOE FUSION Left 2021    Procedure: FIRST METATARSOPHALANGEAL JOINT ARTHRODESIS;  Surgeon: Chaitanya Jimenes DPM;  [facility]: [facility];  Service: Podiatry;  Laterality: Left;     Social History     Socioeconomic History    Marital status:    Tobacco Use    Smoking status: Never    Smokeless tobacco: Never   Vaping Use    Vaping status: Never Used    Passive vaping exposure: Yes   Substance and Sexual Activity    Alcohol use: Yes     Comment: socially    Drug use: Never    Sexual activity: Defer       Current Outpatient Medications:     magnesium chloride ER 64 MG DR tablet, Take  by mouth Daily., Disp: , Rfl:     vitamin D3 (Vitamin D) 125 MCG (5000 UT) capsule capsule, Take 1 capsule by mouth Daily., Disp: 90 capsule, Rfl: 1

## 2024-04-23 RX ORDER — SODIUM, POTASSIUM,MAG SULFATES 17.5-3.13G
SOLUTION, RECONSTITUTED, ORAL ORAL
Qty: 354 ML | Refills: 0 | Status: SHIPPED | OUTPATIENT
Start: 2024-04-23

## 2024-04-23 NOTE — TELEPHONE ENCOUNTER
Pt called and left a vm stating she has not received prep at this time.   Prep pended for correct pharmacy.

## 2024-04-24 ENCOUNTER — TELEPHONE (OUTPATIENT)
Dept: GASTROENTEROLOGY | Facility: CLINIC | Age: 59
End: 2024-04-24
Payer: OTHER GOVERNMENT

## 2024-04-24 NOTE — TELEPHONE ENCOUNTER
Jazmin William Duty  1965    Patient requested to Reschedule their Colonoscopy. I have offered to reschedule this patient and patient has agreed. Patient has been rescheduled to 05/10/2024 at 12:30 pm.    Reason for cancelling/rescheduling: sick with flu    This procedure was ordered by Lois Edwards MD for an important reason. We want to inform you that there are risks associated with not proceeding with the procedure at this time such as a delay in diagnosis, risk of incurable disease, or cancer.    Patient plans to call us back to reschedule: No    Updated clearance needed?: No    If yes, clearance request has been submitted to: N/A Provider Name    Is the patient currently on any injectable medications for weight loss or diabetes? No    If yes, are they aware that they will need to discontinue this 7 days prior to their procedure? No    Patient verbalized understanding for all of the above information.

## 2024-05-08 ENCOUNTER — TELEPHONE (OUTPATIENT)
Dept: GASTROENTEROLOGY | Facility: CLINIC | Age: 59
End: 2024-05-08
Payer: OTHER GOVERNMENT

## 2024-05-14 ENCOUNTER — OFFICE VISIT (OUTPATIENT)
Dept: FAMILY MEDICINE CLINIC | Facility: CLINIC | Age: 59
End: 2024-05-14
Payer: OTHER GOVERNMENT

## 2024-05-14 VITALS
BODY MASS INDEX: 35.17 KG/M2 | HEIGHT: 63 IN | WEIGHT: 198.5 LBS | TEMPERATURE: 97.7 F | OXYGEN SATURATION: 95 % | HEART RATE: 80 BPM | DIASTOLIC BLOOD PRESSURE: 64 MMHG | SYSTOLIC BLOOD PRESSURE: 122 MMHG

## 2024-05-14 DIAGNOSIS — G89.29 CHRONIC PAIN OF RIGHT KNEE: Primary | ICD-10-CM

## 2024-05-14 DIAGNOSIS — M25.561 CHRONIC PAIN OF RIGHT KNEE: Primary | ICD-10-CM

## 2024-05-14 PROCEDURE — 99213 OFFICE O/P EST LOW 20 MIN: CPT | Performed by: NURSE PRACTITIONER

## 2024-05-14 NOTE — ASSESSMENT & PLAN NOTE
I will have her get an x-ray of the right knee and get her referred to Ortho.  I offered to prescribe her an anti-inflammatory but she states she does not need this and will take over-the-counter as needed.  I did advise her to either continue to wrap or to get a soft knee brace to wear.  Recommended ice packs as needed.  Handout provided, see AVS.

## 2024-05-14 NOTE — PROGRESS NOTES
"Chief Complaint  Knee Pain (Right knee pain from knee shooting up into thigh)    Subjective            Jazmin Jennings is a 59 y.o. female who presents to [facility]   History of Present Illness  She is here today with complaints of right knee pain.  She states her pain has been intermittent since October.  She states that the pain is behind her knee and is worse when she has been climbing stairs or ladders.  She states the pain will radiate up her thigh.  She has not had an x-ray.  She states she has tried wrapping it and she does take Tylenol as needed.      Tobacco Use: Low Risk  (5/14/2024)    Patient History     Smoking Tobacco Use: Never     Smokeless Tobacco Use: Never     Passive Exposure: Not on file      E-cigarette/Vaping    E-cigarette/Vaping Use Never User     Passive Exposure Yes     Counseling Given Yes      E-cigarette/Vaping Substances    Nicotine No     THC No     CBD No     Flavoring No      E-cigarette/Vaping Devices    Disposable No     Pre-filled or Refillable Cartridge No     Refillable Tank No     Pre-filled Pod No        Alcohol Use: Not on file         Objective   Vital Signs:   Vitals:    05/14/24 1351 05/14/24 1355   BP: (!) 147/118 122/64   BP Location: Left arm    Patient Position: Sitting    Cuff Size: Adult    Pulse: 80    Temp: 97.7 °F (36.5 °C)    SpO2: 95%    Weight: 90 kg (198 lb 8 oz)    Height: 160 cm (63\")      Body mass index is 35.16 kg/m².    Wt Readings from Last 3 Encounters:   05/14/24 90 kg (198 lb 8 oz)   01/23/24 91.9 kg (202 lb 11.2 oz)   11/18/21 91.6 kg (202 lb)     BP Readings from Last 3 Encounters:   05/14/24 122/64   01/23/24 132/80   11/18/21 124/82       Health Maintenance   Topic Date Due    COLORECTAL CANCER SCREENING  Never done    COVID-19 Vaccine (1 - 2023-24 season) 01/20/2025 (Originally 9/1/2023)    ZOSTER VACCINE (1 of 2) 01/20/2025 (Originally 4/28/2013)    RSV Vaccine - Adults (1 - 1-dose 60+ series) 05/14/2025 " "(Originally 4/28/2023)    INFLUENZA VACCINE  08/01/2024    ANNUAL PHYSICAL  01/23/2025    BMI FOLLOWUP  01/23/2025    MAMMOGRAM  02/27/2026    PAP SMEAR  09/20/2026    TDAP/TD VACCINES (2 - Td or Tdap) 01/23/2034    HEPATITIS C SCREENING  Completed    Pneumococcal Vaccine 0-64  Aged Out       /64   Pulse 80   Temp 97.7 °F (36.5 °C)   Ht 160 cm (63\")   Wt 90 kg (198 lb 8 oz)   SpO2 95%   BMI 35.16 kg/m²       Current Outpatient Medications:     magnesium chloride ER 64 MG DR tablet, Take  by mouth Daily., Disp: , Rfl:     sodium-potassium-magnesium sulfates (SUPREP) 17.5-3.13-1.6 GM/177ML solution oral solution, Take as directed, Disp: 354 mL, Rfl: 0    vitamin D3 (Vitamin D) 125 MCG (5000 UT) capsule capsule, Take 1 capsule by mouth Daily., Disp: 90 capsule, Rfl: 1   Past Medical History:   Diagnosis Date    Arthritis     Bunion     GERD (gastroesophageal reflux disease)     Ingrown toenail     PONV (postoperative nausea and vomiting)         Physical Exam  Vitals and nursing note reviewed.   Constitutional:       Appearance: Normal appearance. She is obese.   Pulmonary:      Effort: Pulmonary effort is normal.   Musculoskeletal:      Right knee: No swelling. Tenderness present over the PCL.   Neurological:      General: No focal deficit present.      Mental Status: She is alert and oriented to person, place, and time.   Psychiatric:         Mood and Affect: Mood normal.          Result Review :    The following data was reviewed by: FATIMAH Beckman on 05/14/2024:  CMP   CMP          1/23/2024    09:01   CMP   Glucose 98    BUN 19    Creatinine 0.89    EGFR 74.3    Sodium 141    Potassium 4.5    Chloride 103    Calcium 9.5    Total Protein 7.6    Albumin 4.6    Globulin 3.0    Total Bilirubin 0.3    Alkaline Phosphatase 63    AST (SGOT) 27    ALT (SGPT) 32    Albumin/Globulin Ratio 1.5    BUN/Creatinine Ratio 21.3    Anion Gap 13.1             Mammo Screening Digital Tomosynthesis Bilateral " With CAD    Result Date: 2/27/2024   Benign mammogram. Suggest routine mammographic screening.  RECOMMENDATION(S):  ROUTINE MAMMOGRAM AND CLINICAL EVALUATION IN 12 MONTHS.   BIRADS:  DIAGNOSTIC CATEGORY 2--BENIGN FINDING   BREAST COMPOSITION: Scattered areas fibroglandular density.  PLEASE NOTE:  A NORMAL MAMMOGRAM DOES NOT EXCLUDE THE POSSIBILITY OF BREAST CANCER. ANY CLINICALLY SUSPICIOUS PALPABLE LUMP SHOULD BE BIOPSIED.      MING YA MD       Electronically Signed and Approved By: MING YA MD on 2/27/2024 at 13:49             Assessment & Plan  Chronic pain of right knee  I will have her get an x-ray of the right knee and get her referred to Ortho.  I offered to prescribe her an anti-inflammatory but she states she does not need this and will take over-the-counter as needed.  I did advise her to either continue to wrap or to get a soft knee brace to wear.  Recommended ice packs as needed.  Handout provided, see AVS.    Orders Placed This Encounter   Procedures    XR Knee 3 View Right    Ambulatory Referral to Orthopedic Surgery        Diagnosis Plan   1. Chronic pain of right knee  XR Knee 3 View Right    Ambulatory Referral to Orthopedic Surgery            FOLLOW UP  Return if symptoms worsen or fail to improve.  Patient was given instructions and counseling regarding her condition or for health maintenance advice. Please see specific information pulled into the AVS if appropriate.      CURRENT & DISCONTINUED MEDICATIONS  Current Outpatient Medications   Medication Instructions    magnesium chloride ER 64 MG DR tablet Oral, Daily    sodium-potassium-magnesium sulfates (SUPREP) 17.5-3.13-1.6 GM/177ML solution oral solution Take as directed    vitamin D3 5,000 Units, Oral, Daily       There are no discontinued medications.     Parts of this note are electronic transcriptions/translations of spoken language to printed text using the Dragon Dictation system.    Maida Peoples,  FATIMAH  05/14/24  14:47 EDT

## 2024-05-17 ENCOUNTER — PATIENT ROUNDING (BHMG ONLY) (OUTPATIENT)
Dept: FAMILY MEDICINE CLINIC | Facility: CLINIC | Age: 59
End: 2024-05-17
Payer: OTHER GOVERNMENT

## 2024-05-17 NOTE — PROGRESS NOTES
A My-Chart message has been sent to the patient for PATIENT ROUNDING with [facility] [place].

## 2024-05-20 ENCOUNTER — HOSPITAL ENCOUNTER (OUTPATIENT)
Dept: GENERAL RADIOLOGY | Facility: HOSPITAL | Age: 59
Discharge: HOME | End: 2024-05-20
Admitting: NURSE PRACTITIONER
Payer: OTHER GOVERNMENT

## 2024-05-20 DIAGNOSIS — G89.29 CHRONIC PAIN OF RIGHT KNEE: ICD-10-CM

## 2024-05-20 DIAGNOSIS — M25.561 CHRONIC PAIN OF RIGHT KNEE: ICD-10-CM

## 2024-05-20 PROCEDURE — 73562 X-RAY EXAM OF KNEE 3: CPT

## 2024-05-28 ENCOUNTER — OFFICE VISIT (OUTPATIENT)
Dept: ORTHOPEDIC SURGERY | Facility: CLINIC | Age: 59
End: 2024-05-28
Payer: OTHER GOVERNMENT

## 2024-05-28 VITALS
SYSTOLIC BLOOD PRESSURE: 129 MMHG | HEIGHT: 63 IN | BODY MASS INDEX: 35.08 KG/M2 | HEART RATE: 80 BPM | WEIGHT: 198 LBS | OXYGEN SATURATION: 95 % | DIASTOLIC BLOOD PRESSURE: 84 MMHG

## 2024-05-28 DIAGNOSIS — M25.561 RIGHT KNEE PAIN, UNSPECIFIED CHRONICITY: Primary | ICD-10-CM

## 2024-05-28 DIAGNOSIS — M76.891 HAMSTRING TENDONITIS OF RIGHT THIGH: ICD-10-CM

## 2024-05-28 DIAGNOSIS — M17.11 PRIMARY OSTEOARTHRITIS OF RIGHT KNEE: ICD-10-CM

## 2024-05-28 PROCEDURE — 99203 OFFICE O/P NEW LOW 30 MIN: CPT | Performed by: ORTHOPAEDIC SURGERY

## 2024-05-28 NOTE — PROGRESS NOTES
"Chief Complaint  Initial Evaluation of the Right Knee     Subjective      Jazmin Jennings presents to [facility] for an evaluation of her right knee. She reports she has been trying to work out and lose weight. She reports she was using a kettle ball when she noticed her pain to her knee. She reports this has been bothering her for several months. She reports pain to the posterior knee. She went to her family doctor where they ordered x-rays which showed arthritis. She reports she has been to outpatient physical therapy without much relief. She reports she has been wearing a brace and reports it provided some relief.     No Known Allergies     Social History     Socioeconomic History    Marital status:    Tobacco Use    Smoking status: Never    Smokeless tobacco: Never   Vaping Use    Vaping status: Never Used    Passive vaping exposure: Yes   Substance and Sexual Activity    Alcohol use: Yes     Comment: socially    Drug use: Never    Sexual activity: Defer        I reviewed the patient's chief complaint, history of present illness, review of systems, past medical history, surgical history, family history, social history, medications, and allergy list.     Review of Systems     Constitutional: Denies fevers, chills, weight loss  Cardiovascular: Denies chest pain, shortness of breath  Skin: Denies rashes, acute skin changes  Neurologic: Denies headache, loss of consciousness  MSK: Right knee pain       Vital Signs:   /84   Pulse 80   Ht 160 cm (63\")   Wt 89.8 kg (198 lb)   SpO2 95%   BMI 35.07 kg/m²            Ortho Exam    Physical Exam  General:Alert. No acute distress   Right lower extremity: -3 of extension, flexion to 120 degrees, stable to varus/valgus stress, stable to anterior/posterior drawer, negative Floyd's, negative Lachman's, tender over the posterior lateral knee, tender over the IT band, neurovascularly intact, calf soft, positive EHL, FHL, GS, and TA. " Sensation intact to all 5 nerves of the foot.     Procedures    Imaging Results (Most Recent)       None             Result Review :       XR Knee 3 View Right    Result Date: 5/20/2024  Narrative: DATE OF EXAM: 5/20/2024 11:44 AM  PROCEDURE: XR KNEE 3 VW RIGHT-  INDICATIONS: right knee pain posterior radiates up to thigh; M25.561-Pain in right knee; G89.29-Other chronic pain  COMPARISON: No Comparisons Available  TECHNIQUE: 3 views of the right knee were obtained.  FINDINGS: No fracture is identified. Mineralization and osseous alignment appear within normal limits. Soft tissues appear unremarkable.  No joint effusion is identified. There is mild osteophyte formation with mild joint space narrowing.      Impression: Mild osteoarthritis.   Electronically Signed By-Angel Mcfarlane MD On:5/20/2024 3:47 PM              Assessment and Plan     Diagnoses and all orders for this visit:    1. Right knee pain, unspecified chronicity (Primary)    2. Primary osteoarthritis of right knee    3. Hamstring tendonitis of right thigh        The patient presents here today for an evaluation of her right knee.     MRI order placed today to evaluate for internal derangement.     She will continue her knee brace and over the counter medications.     Home exercises given to the patient today.       Call or return if worsening symptoms.    Follow Up     After MRI     Patient was given instructions and counseling regarding her condition or for health maintenance advice. Please see specific information pulled into the AVS if appropriate.     Scribed for Austin Barbour MD by Sylvia Dick.  05/28/24   10:28 EDT    I have personally performed the services described in this document as scribed by the above individual and it is both accurate and complete. Austin Barbour MD 05/29/24

## 2024-05-30 ENCOUNTER — PATIENT ROUNDING (BHMG ONLY) (OUTPATIENT)
Dept: ORTHOPEDIC SURGERY | Facility: CLINIC | Age: 59
End: 2024-05-30
Payer: OTHER GOVERNMENT

## 2024-06-17 ENCOUNTER — HOSPITAL ENCOUNTER (OUTPATIENT)
Dept: MRI IMAGING | Facility: HOSPITAL | Age: 59
Discharge: HOME | End: 2024-06-17
Admitting: ORTHOPAEDIC SURGERY
Payer: OTHER GOVERNMENT

## 2024-06-17 DIAGNOSIS — M17.11 PRIMARY OSTEOARTHRITIS OF RIGHT KNEE: ICD-10-CM

## 2024-06-17 DIAGNOSIS — M25.561 RIGHT KNEE PAIN, UNSPECIFIED CHRONICITY: ICD-10-CM

## 2024-06-17 DIAGNOSIS — M76.891 HAMSTRING TENDONITIS OF RIGHT THIGH: ICD-10-CM

## 2024-06-17 PROCEDURE — 73721 MRI JNT OF LWR EXTRE W/O DYE: CPT

## 2024-06-19 PROBLEM — M17.11 PRIMARY OSTEOARTHRITIS OF RIGHT KNEE: Status: ACTIVE | Noted: 2024-06-19

## 2024-06-19 PROBLEM — S83.241A ACUTE MEDIAL MENISCUS TEAR OF RIGHT KNEE: Status: ACTIVE | Noted: 2024-06-19

## 2024-06-19 PROBLEM — M25.561 RIGHT KNEE PAIN: Status: ACTIVE | Noted: 2024-06-19

## 2024-06-19 PROBLEM — S83.281A TEAR OF LATERAL MENISCUS OF RIGHT KNEE: Status: ACTIVE | Noted: 2024-06-19

## 2024-06-20 ENCOUNTER — PREP FOR SURGERY (OUTPATIENT)
Dept: OTHER | Facility: HOSPITAL | Age: 59
End: 2024-06-20
Payer: OTHER GOVERNMENT

## 2024-06-20 ENCOUNTER — OFFICE VISIT (OUTPATIENT)
Dept: ORTHOPEDIC SURGERY | Facility: CLINIC | Age: 59
End: 2024-06-20
Payer: OTHER GOVERNMENT

## 2024-06-20 VITALS
HEIGHT: 63 IN | HEART RATE: 70 BPM | WEIGHT: 198 LBS | SYSTOLIC BLOOD PRESSURE: 150 MMHG | OXYGEN SATURATION: 95 % | DIASTOLIC BLOOD PRESSURE: 94 MMHG | BODY MASS INDEX: 35.08 KG/M2

## 2024-06-20 DIAGNOSIS — M25.561 RIGHT KNEE PAIN, UNSPECIFIED CHRONICITY: Primary | ICD-10-CM

## 2024-06-20 DIAGNOSIS — S83.241A ACUTE MEDIAL MENISCUS TEAR OF RIGHT KNEE, INITIAL ENCOUNTER: ICD-10-CM

## 2024-06-20 DIAGNOSIS — S83.281A TEAR OF LATERAL MENISCUS OF RIGHT KNEE, UNSPECIFIED TEAR TYPE, UNSPECIFIED WHETHER OLD OR CURRENT TEAR, INITIAL ENCOUNTER: Primary | ICD-10-CM

## 2024-06-20 DIAGNOSIS — S83.281A TEAR OF LATERAL MENISCUS OF RIGHT KNEE, UNSPECIFIED TEAR TYPE, UNSPECIFIED WHETHER OLD OR CURRENT TEAR, INITIAL ENCOUNTER: ICD-10-CM

## 2024-06-20 DIAGNOSIS — M17.11 PRIMARY OSTEOARTHRITIS OF RIGHT KNEE: ICD-10-CM

## 2024-06-20 NOTE — H&P (VIEW-ONLY)
"Chief Complaint  Follow-up of the Right Knee    Subjective          History of Present Illness      Jazmin Jennings is a 59 y.o. female  presents to [facility] for     Patient presents with her  for follow-up evaluation of right knee pain and to review right knee MRI she saw Dr. Barbour for initial evaluation 5/28/2024 patient injured her knee when lifting a kettle bell working out and losing weight in September.  She has tried bracing she has had pain in the anterior lateral posterior lateral knee and medial knee she has had x-rays she had an MRI ordered to rule out internal derangement she is here to review this today she states her pain and symptoms persist.      No Known Allergies     Social History     Socioeconomic History    Marital status:    Tobacco Use    Smoking status: Never    Smokeless tobacco: Never   Vaping Use    Vaping status: Never Used    Passive vaping exposure: Yes   Substance and Sexual Activity    Alcohol use: Yes     Comment: socially    Drug use: Never    Sexual activity: Defer        REVIEW OF SYSTEMS    Constitutional: Awake alert and oriented x3, no acute distress, denies fevers, chills, weight loss  Respiratory: No respiratory distress  Vascular: Brisk cap refill, Intact distal pulses, No cyanosis, compartments soft with no signs or symptoms of compartment syndrome or DVT.   Cardiovascular: Denies chest pain, shortness of breath  Skin: Denies rashes, acute skin changes  Neurologic: Denies headache, loss of consciousness  MSK: Right knee pain      Objective   Vital Signs:   /94   Pulse 70   Ht 160 cm (62.99\")   Wt 89.8 kg (198 lb)   SpO2 95%   BMI 35.08 kg/m²     Body mass index is 35.08 kg/m².    Physical Exam       Right lower extremity: -3 of extension, flexion to 120 degrees, stable to varus/valgus stress, stable to anterior/posterior drawer, negative Floyd's, negative Lachman's, tender over the posterior lateral knee, " tender over the IT band, neurovascularly intact, calf soft, positive EHL, FHL, GS, and TA. Sensation intact to all 5 nerves of the foot.       Procedures    Imaging Results (Most Recent)       None         MRI Knee Right Without Contrast    Result Date: 6/18/2024  Narrative: MRI KNEE RIGHT  WO CONTRAST Date of Exam: 6/17/2024 12:00 PM EDT Indication: Right knee pain.  Comparison: Radiographs May 20, 2024 Technique:  Routine multiplanar/multisequence images of the right knee were obtained without contrast administration. Findings: Osseous Structures and Intra-Articular There is mild osteophyte formation. Probable intraosseous ganglion cyst at the posterior tibial plateau near the posterior cruciate ligament insertion. No definite fracture. No suspicious osseous lesion. No significant joint effusion. Ligaments The anterior cruciate ligament and posterior cruciate ligaments appear intact.  Medial collateral ligament and lateral collateral ligament complex appear intact. Menisci There is suspicion for a small free edge tear/fraying of the lateral meniscus body. There is also some hyperintense signal at the posterior root of the lateral meniscus which may be seen with partial tear, synovitis, or degeneration. There is suspicion for a small horizontal free edge tear of the medial meniscus at the junction of the posterior horn and body on coronal images 15-17. Extensor compartment Quadriceps and patellar tendons appear intact. Patellar alignment appears within the limits. Cartilage There is suspected partial thickness cartilage thinning and fibrillation of the central and inferior patella. There is also a small high-grade cartilage defect at the inferior medial trochlea with a tiny focus of subchondral edema signal. There appears to be mild medial and lateral tibiofemoral compartment chondromalacia. No significant focal high-grade cartilage defect is identified at this time. Soft tissues Trace fluid in a Baker's cyst. Mild  edema in the anterior subcutaneous tissues. No other significant localized collection is seen at this time. Miscellaneous Popliteus tendon and iliotibial band appear intact.     Impression: Impression: 1.Suspected small free edge tear/fraying of the lateral meniscus body and small horizontal free edge tear of the medial meniscus at the junction of the posterior horn and body. 2.Hyperintense signal at the posterior root of the lateral meniscus which may be seen with partial tear, synovitis, or degeneration. 3.Mild tricompartmental chondromalacia with small high-grade cartilage defect at the inferior medial trochlea with tiny focus of subchondral edema. Electronically Signed: Angel Maeve  6/18/2024 12:58 PM EDT  Workstation ID: [id number]       Result Review :   The following data was reviewed by: NICOLLE Wise on 06/20/2024:  Data reviewed : Radiologic studies reviewed by myself with the patient Dr. Barbour also reviewed with the patient              Assessment and Plan    Diagnoses and all orders for this visit:    1. Right knee pain, unspecified chronicity (Primary)    2. Primary osteoarthritis of right knee    3. Acute medial meniscus tear of right knee, initial encounter    4. Tear of lateral meniscus of right knee, unspecified tear type, unspecified whether old or current tear, initial encounter        Reviewed MRI with the patient discussed diagnosis and treatment options including conservative versus surgical intervention we discussed physical therapy injections or arthroscopic surgery, patient considered all options she was advised of risk benefits procedure and recovery of right knee arthroscopic partial medial and partial lateral meniscectomy discussed with her with Dr. Barbour and myself, she elected to have the surgery scheduled, follow-up 2 weeks postop.    Discussed surgery., Risks/benefits discussed with patient including, but not limited to: infection, bleeding, neurovascular  damage, re-rupture, aesthetic deformity, need for further surgery, and death., Risks/benefits discussed with patient including, but not limited to: infection, bleeding, neurovascular damage, malunion, nonunion, aesthetic deformity, need for further surgery, and death., Discussed with patient the implant type being used during surgery and patient understands., Surgery pamphlet given., Call or return if worsening symptoms., DME order for a 3 in 1 given today due to patient will be confined to one room/level of the home that does not offer a toilet during postop recovery. , I am requesting authorization for the trinidad® System to reduce the patient's pain and aid in their recovery. I believe the trinidad® Sport System will have positive impact on my patient's quality of life. I would appreciate prompt review of the information and authorization of the trinidad® System.  trinidad® Sport utilizes ultrasonic waves that penetrate 5 cm into the tissue, which increases circulation, oxygen and nutrient delivery, and the removal of waste products, such as lactic acid, from the site of the musculoskeletal injury.  The trinidad® System is a new FDA approved (FDA 510K 674751) treatment modality that that has been shown in recent clinical trials sponsored by the NIH (National Institutes of Health), the DOD (Department of Defense) and [employer] the research arm of the NASA (National [employer]eron[employer]ics and Space [employer]) to reduce patient's pain, increase mobility and speed recovery. , I am ordering the use of the Nice1 cold therapy machine for 60 days post-op as part of an opioid-sparing approach to help manage pain and edema.  I feel this is medically necessary for the best care for this patient.   , and IFC can help extend pain relief and hopefully reduce need for pain medication. TENS is used to control break through pain. NMES is used to help and strengthen weak muscles and prevent atrophy.    Follow Up   Return for 2 WEEKS POST OP.  Patient was given  instructions and counseling regarding her condition or for health maintenance advice. Please see specific information pulled into the AVS if appropriate.       EMR Dragon/Transcription disclaimer:  Part of this note may be an electronic transcription/translation of spoken language to printed text using the Dragon Dictation System

## 2024-06-20 NOTE — PROGRESS NOTES
"Chief Complaint  Follow-up of the Right Knee    Subjective          History of Present Illness      Jazmin Jennings is a 59 y.o. female  presents to [facility] for     Patient presents with her  for follow-up evaluation of right knee pain and to review right knee MRI she saw Dr. Barbour for initial evaluation 5/28/2024 patient injured her knee when lifting a kettle bell working out and losing weight in September.  She has tried bracing she has had pain in the anterior lateral posterior lateral knee and medial knee she has had x-rays she had an MRI ordered to rule out internal derangement she is here to review this today she states her pain and symptoms persist.      No Known Allergies     Social History     Socioeconomic History    Marital status:    Tobacco Use    Smoking status: Never    Smokeless tobacco: Never   Vaping Use    Vaping status: Never Used    Passive vaping exposure: Yes   Substance and Sexual Activity    Alcohol use: Yes     Comment: socially    Drug use: Never    Sexual activity: Defer        REVIEW OF SYSTEMS    Constitutional: Awake alert and oriented x3, no acute distress, denies fevers, chills, weight loss  Respiratory: No respiratory distress  Vascular: Brisk cap refill, Intact distal pulses, No cyanosis, compartments soft with no signs or symptoms of compartment syndrome or DVT.   Cardiovascular: Denies chest pain, shortness of breath  Skin: Denies rashes, acute skin changes  Neurologic: Denies headache, loss of consciousness  MSK: Right knee pain      Objective   Vital Signs:   /94   Pulse 70   Ht 160 cm (62.99\")   Wt 89.8 kg (198 lb)   SpO2 95%   BMI 35.08 kg/m²     Body mass index is 35.08 kg/m².    Physical Exam       Right lower extremity: -3 of extension, flexion to 120 degrees, stable to varus/valgus stress, stable to anterior/posterior drawer, negative Floyd's, negative Lachman's, tender over the posterior lateral knee, " tender over the IT band, neurovascularly intact, calf soft, positive EHL, FHL, GS, and TA. Sensation intact to all 5 nerves of the foot.       Procedures    Imaging Results (Most Recent)       None         MRI Knee Right Without Contrast    Result Date: 6/18/2024  Narrative: MRI KNEE RIGHT  WO CONTRAST Date of Exam: 6/17/2024 12:00 PM EDT Indication: Right knee pain.  Comparison: Radiographs May 20, 2024 Technique:  Routine multiplanar/multisequence images of the right knee were obtained without contrast administration. Findings: Osseous Structures and Intra-Articular There is mild osteophyte formation. Probable intraosseous ganglion cyst at the posterior tibial plateau near the posterior cruciate ligament insertion. No definite fracture. No suspicious osseous lesion. No significant joint effusion. Ligaments The anterior cruciate ligament and posterior cruciate ligaments appear intact.  Medial collateral ligament and lateral collateral ligament complex appear intact. Menisci There is suspicion for a small free edge tear/fraying of the lateral meniscus body. There is also some hyperintense signal at the posterior root of the lateral meniscus which may be seen with partial tear, synovitis, or degeneration. There is suspicion for a small horizontal free edge tear of the medial meniscus at the junction of the posterior horn and body on coronal images 15-17. Extensor compartment Quadriceps and patellar tendons appear intact. Patellar alignment appears within the limits. Cartilage There is suspected partial thickness cartilage thinning and fibrillation of the central and inferior patella. There is also a small high-grade cartilage defect at the inferior medial trochlea with a tiny focus of subchondral edema signal. There appears to be mild medial and lateral tibiofemoral compartment chondromalacia. No significant focal high-grade cartilage defect is identified at this time. Soft tissues Trace fluid in a Baker's cyst. Mild  edema in the anterior subcutaneous tissues. No other significant localized collection is seen at this time. Miscellaneous Popliteus tendon and iliotibial band appear intact.     Impression: Impression: 1.Suspected small free edge tear/fraying of the lateral meniscus body and small horizontal free edge tear of the medial meniscus at the junction of the posterior horn and body. 2.Hyperintense signal at the posterior root of the lateral meniscus which may be seen with partial tear, synovitis, or degeneration. 3.Mild tricompartmental chondromalacia with small high-grade cartilage defect at the inferior medial trochlea with tiny focus of subchondral edema. Electronically Signed: Angel Maeve  6/18/2024 12:58 PM EDT  Workstation ID: [id number]       Result Review :   The following data was reviewed by: NICOLLE Wise on 06/20/2024:  Data reviewed : Radiologic studies reviewed by myself with the patient Dr. Barbour also reviewed with the patient              Assessment and Plan    Diagnoses and all orders for this visit:    1. Right knee pain, unspecified chronicity (Primary)    2. Primary osteoarthritis of right knee    3. Acute medial meniscus tear of right knee, initial encounter    4. Tear of lateral meniscus of right knee, unspecified tear type, unspecified whether old or current tear, initial encounter        Reviewed MRI with the patient discussed diagnosis and treatment options including conservative versus surgical intervention we discussed physical therapy injections or arthroscopic surgery, patient considered all options she was advised of risk benefits procedure and recovery of right knee arthroscopic partial medial and partial lateral meniscectomy discussed with her with Dr. Barbour and myself, she elected to have the surgery scheduled, follow-up 2 weeks postop.    Discussed surgery., Risks/benefits discussed with patient including, but not limited to: infection, bleeding, neurovascular  damage, re-rupture, aesthetic deformity, need for further surgery, and death., Risks/benefits discussed with patient including, but not limited to: infection, bleeding, neurovascular damage, malunion, nonunion, aesthetic deformity, need for further surgery, and death., Discussed with patient the implant type being used during surgery and patient understands., Surgery pamphlet given., Call or return if worsening symptoms., DME order for a 3 in 1 given today due to patient will be confined to one room/level of the home that does not offer a toilet during postop recovery. , I am requesting authorization for the trinidad® System to reduce the patient's pain and aid in their recovery. I believe the trinidad® Sport System will have positive impact on my patient's quality of life. I would appreciate prompt review of the information and authorization of the trinidad® System.  trinidad® Sport utilizes ultrasonic waves that penetrate 5 cm into the tissue, which increases circulation, oxygen and nutrient delivery, and the removal of waste products, such as lactic acid, from the site of the musculoskeletal injury.  The trinidad® System is a new FDA approved (FDA 510K 167454) treatment modality that that has been shown in recent clinical trials sponsored by the NIH (National Institutes of Health), the DOD (Department of Defense) and [employer] the research arm of the NASA (National Aeronautics and Space Administration) to reduce patient's pain, increase mobility and speed recovery. , I am ordering the use of the Nice1 cold therapy machine for 60 days post-op as part of an opioid-sparing approach to help manage pain and edema.  I feel this is medically necessary for the best care for this patient.   , and IFC can help extend pain relief and hopefully reduce need for pain medication. TENS is used to control break through pain. NMES is used to help and strengthen weak muscles and prevent atrophy.    Follow Up   Return for 2 WEEKS POST OP.  Patient was given  instructions and counseling regarding her condition or for health maintenance advice. Please see specific information pulled into the AVS if appropriate.       EMR Dragon/Transcription disclaimer:  Part of this note may be an electronic transcription/translation of spoken language to printed text using the Dragon Dictation System

## 2024-07-12 NOTE — PRE-PROCEDURE INSTRUCTIONS
IMPORTANT INSTRUCTIONS - PRE-ADMISSION TESTING  DO NOT EAT OR CHEW anything after midnight the night before your procedure.    You may have SIPS NO RED LESS THEN 8 OZ CLEAR liquids up to __2____ hours prior to ARRIVAL time.  Take the following medications the morning of your procedure with JUST A SIP OF WATER:  _____NONE__________________________________________________________________________________________________________________________________________________________________________________    DO NOT BRING your medications to the hospital with you, UNLESS something has changed since your PRE-Admission Testing appointment.  Hold all vitamins, supplements, and NSAIDS (Non- steroidal anti-inflammatory meds) for one week prior to surgery (you MAY take Tylenol or Acetaminophen).  If you are diabetic, check your blood sugar the morning of your procedure. If it is less than 70 or if you are feeling symptomatic, call the following number for further instructions: [phone]  Use your inhalers/nebulizers as usual, the morning of your procedure. BRING YOUR INHALERS with you.   Bring your CPAP or BIPAP to hospital, ONLY IF YOU WILL BE SPENDING THE NIGHT.   Make sure you have a ride home and have someone who will stay with you the day of your procedure after you go home.  If you have any questions, please call your Pre-Admission Testing Nurse, ___SENAIT_____________ at [phone].   Per anesthesia request, do not smoke for 24 hours before your procedure or as instructed by your surgeon.    WILL CALL ON   7/12/24      NORMALLY BETWEEN 1 AND 4 PM TO GIVE OFFICIAL ARRIVAL TIME FOR DAY OF PROCEDURE  BATHING INSTRUCTIONS GIVEN. NO JEWELRY OF ANY TYPE OR NAIL POLISH UPPER OR LOWER EXT   COME TO ENTRANCE C [facility] MAIN DOOR DAY OF PROCEDURE

## 2024-07-15 ENCOUNTER — ANESTHESIA EVENT (OUTPATIENT)
Dept: PERIOP | Facility: HOSPITAL | Age: 59
End: 2024-07-15
Payer: OTHER GOVERNMENT

## 2024-07-15 ENCOUNTER — ANESTHESIA (OUTPATIENT)
Dept: PERIOP | Facility: HOSPITAL | Age: 59
End: 2024-07-15
Payer: OTHER GOVERNMENT

## 2024-07-15 ENCOUNTER — HOSPITAL ENCOUNTER (OUTPATIENT)
Facility: HOSPITAL | Age: 59
Setting detail: HOSPITAL OUTPATIENT SURGERY
Discharge: HOME | End: 2024-07-15
Attending: ORTHOPAEDIC SURGERY | Admitting: ORTHOPAEDIC SURGERY
Payer: OTHER GOVERNMENT

## 2024-07-15 VITALS
HEIGHT: 63 IN | WEIGHT: 201.06 LBS | TEMPERATURE: 97 F | BODY MASS INDEX: 35.62 KG/M2 | RESPIRATION RATE: 18 BRPM | HEART RATE: 49 BPM | DIASTOLIC BLOOD PRESSURE: 80 MMHG | OXYGEN SATURATION: 96 % | SYSTOLIC BLOOD PRESSURE: 123 MMHG

## 2024-07-15 DIAGNOSIS — S83.241A ACUTE MEDIAL MENISCUS TEAR OF RIGHT KNEE, INITIAL ENCOUNTER: Primary | ICD-10-CM

## 2024-07-15 LAB
QT INTERVAL: 411 MS
QTC INTERVAL: 436 MS

## 2024-07-15 PROCEDURE — 25010000002 DEXAMETHASONE PER 1 MG: Performed by: NURSE ANESTHETIST, CERTIFIED REGISTERED

## 2024-07-15 PROCEDURE — 25010000002 PROPOFOL 10 MG/ML EMULSION: Performed by: NURSE ANESTHETIST, CERTIFIED REGISTERED

## 2024-07-15 PROCEDURE — 29880 ARTHRS KNE SRG MNISECTMY M&L: CPT | Performed by: ORTHOPAEDIC SURGERY

## 2024-07-15 PROCEDURE — 25010000002 BUPIVACAINE (PF) 0.5 % SOLUTION 10 ML VIAL: Performed by: ORTHOPAEDIC SURGERY

## 2024-07-15 PROCEDURE — 25010000002 MORPHINE PER 10 MG: Performed by: ORTHOPAEDIC SURGERY

## 2024-07-15 PROCEDURE — 25010000002 MIDAZOLAM PER 1MG: Performed by: ANESTHESIOLOGY

## 2024-07-15 PROCEDURE — 25810000003 LACTATED RINGERS PER 1000 ML: Performed by: ANESTHESIOLOGY

## 2024-07-15 PROCEDURE — 93005 ELECTROCARDIOGRAM TRACING: CPT | Performed by: ANESTHESIOLOGY

## 2024-07-15 PROCEDURE — 25010000002 CEFAZOLIN PER 500 MG: Performed by: PHYSICIAN ASSISTANT

## 2024-07-15 PROCEDURE — 25010000002 FENTANYL CITRATE (PF) 50 MCG/ML SOLUTION: Performed by: NURSE ANESTHETIST, CERTIFIED REGISTERED

## 2024-07-15 PROCEDURE — 25010000002 ONDANSETRON PER 1 MG: Performed by: NURSE ANESTHETIST, CERTIFIED REGISTERED

## 2024-07-15 RX ORDER — PROPOFOL 10 MG/ML
VIAL (ML) INTRAVENOUS AS NEEDED
Status: DISCONTINUED | OUTPATIENT
Start: 2024-07-15 | End: 2024-07-15 | Stop reason: SURG

## 2024-07-15 RX ORDER — ASPIRIN 325 MG
325 TABLET, DELAYED RELEASE (ENTERIC COATED) ORAL DAILY
Qty: 14 TABLET | Refills: 0 | Status: SHIPPED | OUTPATIENT
Start: 2024-07-15

## 2024-07-15 RX ORDER — ONDANSETRON 2 MG/ML
INJECTION INTRAMUSCULAR; INTRAVENOUS AS NEEDED
Status: DISCONTINUED | OUTPATIENT
Start: 2024-07-15 | End: 2024-07-15 | Stop reason: SURG

## 2024-07-15 RX ORDER — FENTANYL CITRATE 50 UG/ML
INJECTION, SOLUTION INTRAMUSCULAR; INTRAVENOUS AS NEEDED
Status: DISCONTINUED | OUTPATIENT
Start: 2024-07-15 | End: 2024-07-15 | Stop reason: SURG

## 2024-07-15 RX ORDER — DEXAMETHASONE SODIUM PHOSPHATE 4 MG/ML
INJECTION, SOLUTION INTRA-ARTICULAR; INTRALESIONAL; INTRAMUSCULAR; INTRAVENOUS; SOFT TISSUE AS NEEDED
Status: DISCONTINUED | OUTPATIENT
Start: 2024-07-15 | End: 2024-07-15 | Stop reason: SURG

## 2024-07-15 RX ORDER — MEPERIDINE HYDROCHLORIDE 25 MG/ML
12.5 INJECTION INTRAMUSCULAR; INTRAVENOUS; SUBCUTANEOUS
Status: DISCONTINUED | OUTPATIENT
Start: 2024-07-15 | End: 2024-07-15 | Stop reason: HOSPADM

## 2024-07-15 RX ORDER — MIDAZOLAM HYDROCHLORIDE 2 MG/2ML
2 INJECTION, SOLUTION INTRAMUSCULAR; INTRAVENOUS ONCE
Status: COMPLETED | OUTPATIENT
Start: 2024-07-15 | End: 2024-07-15

## 2024-07-15 RX ORDER — OXYCODONE HYDROCHLORIDE 5 MG/1
5 TABLET ORAL
Status: DISCONTINUED | OUTPATIENT
Start: 2024-07-15 | End: 2024-07-15 | Stop reason: HOSPADM

## 2024-07-15 RX ORDER — PROMETHAZINE HYDROCHLORIDE 25 MG/1
25 SUPPOSITORY RECTAL ONCE AS NEEDED
Status: DISCONTINUED | OUTPATIENT
Start: 2024-07-15 | End: 2024-07-15 | Stop reason: HOSPADM

## 2024-07-15 RX ORDER — HYDROCODONE BITARTRATE AND ACETAMINOPHEN 7.5; 325 MG/1; MG/1
1 TABLET ORAL EVERY 4 HOURS PRN
Qty: 30 TABLET | Refills: 0 | Status: SHIPPED | OUTPATIENT
Start: 2024-07-15

## 2024-07-15 RX ORDER — LIDOCAINE HYDROCHLORIDE 20 MG/ML
INJECTION, SOLUTION EPIDURAL; INFILTRATION; INTRACAUDAL; PERINEURAL AS NEEDED
Status: DISCONTINUED | OUTPATIENT
Start: 2024-07-15 | End: 2024-07-15 | Stop reason: SURG

## 2024-07-15 RX ORDER — PROMETHAZINE HYDROCHLORIDE 12.5 MG/1
25 TABLET ORAL ONCE AS NEEDED
Status: DISCONTINUED | OUTPATIENT
Start: 2024-07-15 | End: 2024-07-15 | Stop reason: HOSPADM

## 2024-07-15 RX ORDER — SODIUM CHLORIDE, SODIUM LACTATE, POTASSIUM CHLORIDE, CALCIUM CHLORIDE 600; 310; 30; 20 MG/100ML; MG/100ML; MG/100ML; MG/100ML
9 INJECTION, SOLUTION INTRAVENOUS CONTINUOUS PRN
Status: DISCONTINUED | OUTPATIENT
Start: 2024-07-15 | End: 2024-07-15 | Stop reason: HOSPADM

## 2024-07-15 RX ORDER — ACETAMINOPHEN 500 MG
1000 TABLET ORAL ONCE
Status: COMPLETED | OUTPATIENT
Start: 2024-07-15 | End: 2024-07-15

## 2024-07-15 RX ORDER — ONDANSETRON 2 MG/ML
4 INJECTION INTRAMUSCULAR; INTRAVENOUS ONCE AS NEEDED
Status: DISCONTINUED | OUTPATIENT
Start: 2024-07-15 | End: 2024-07-15 | Stop reason: HOSPADM

## 2024-07-15 RX ADMIN — LIDOCAINE HYDROCHLORIDE 40 MG: 20 INJECTION, SOLUTION INTRAVENOUS at 08:18

## 2024-07-15 RX ADMIN — ACETAMINOPHEN 1000 MG: 500 TABLET ORAL at 07:33

## 2024-07-15 RX ADMIN — MIDAZOLAM HYDROCHLORIDE 2 MG: 1 INJECTION, SOLUTION INTRAMUSCULAR; INTRAVENOUS at 08:03

## 2024-07-15 RX ADMIN — ONDANSETRON HYDROCHLORIDE 4 MG: 2 SOLUTION INTRAMUSCULAR; INTRAVENOUS at 08:22

## 2024-07-15 RX ADMIN — SODIUM CHLORIDE, POTASSIUM CHLORIDE, SODIUM LACTATE AND CALCIUM CHLORIDE 9 ML/HR: 600; 310; 30; 20 INJECTION, SOLUTION INTRAVENOUS at 07:33

## 2024-07-15 RX ADMIN — FENTANYL CITRATE 25 MCG: 50 INJECTION, SOLUTION INTRAMUSCULAR; INTRAVENOUS at 08:46

## 2024-07-15 RX ADMIN — PROPOFOL 170 MG: 10 INJECTION, EMULSION INTRAVENOUS at 08:18

## 2024-07-15 RX ADMIN — DEXAMETHASONE SODIUM PHOSPHATE 4 MG: 4 INJECTION, SOLUTION INTRAMUSCULAR; INTRAVENOUS at 08:21

## 2024-07-15 RX ADMIN — FENTANYL CITRATE 25 MCG: 50 INJECTION, SOLUTION INTRAMUSCULAR; INTRAVENOUS at 08:18

## 2024-07-15 RX ADMIN — SODIUM CHLORIDE 2 G: 9 INJECTION, SOLUTION INTRAVENOUS at 08:19

## 2024-07-15 RX ADMIN — FENTANYL CITRATE 25 MCG: 50 INJECTION, SOLUTION INTRAMUSCULAR; INTRAVENOUS at 08:39

## 2024-07-15 RX ADMIN — FENTANYL CITRATE 25 MCG: 50 INJECTION, SOLUTION INTRAMUSCULAR; INTRAVENOUS at 08:32

## 2024-07-15 NOTE — DISCHARGE INSTRUCTIONS
DISCHARGE INSTRUCTIONS  POST-OPERATIVE  Knee Arthroscopic Surgery      For your surgery you had:  General anesthesia (you may have a sore throat for the first 24 hours)  Y  IV sedation.  Local anesthesia  Monitored anesthesia care  You may experience dizziness, drowsiness, or light-headedness for several hours following surgery/procedure.  Do not stay alone today or tonight.  Limit your activity for 24 hours.  Resume your diet slowly.  Follow whatever special dietary instructions you may have been given by your doctor.  You should not drive or operate machinery or drink alcohol for 24 hours or while you are taking pain medication.  You should not sign legally binding documents.    Post-Operative Day #1  Post-Operative Day #1 is counted as the 1st day after surgery.  Leave ace wrap on day one to aid in the reduction of swelling.  If dressing is too tight it can be rewrapped.  Post-Operative Day #2  Ace wrap and dressing may be removed.  Put a 4x4 dressing to suture or staple site.  Change dressing once or twice daily until suture or staples are removed.  It is normal to have some redness or irritation around skin sutures or stapes, however, if you have any expanding areas of redness with a persistent fever and increasing pain notify the physician as soon as possible.  On Post-Operative Day #2, you may want to reapply the ace wrap.  Put ace wrap directly over the knee to add compression and aid in swelling reduction.  It is normal to have some swelling down into the calf and ankle after knee arthroscopy or Anterior Cruciate Ligament (ACL) reconstruction.  If you notice a significant amount of swelling or increasing pain in calf area, notify the physician immediately.   Post-Operative Day #2  You may shower.  During shower, avoid too much water to incision site.  Let water run down leg and then pat dry.  Do not put any ointments on the incision unless directed by surgeon.  Reapply dry dressing to sutures or staple  sites.    General Information  Full weight bearing with crutches is allowed after a standard knee arthroscopy or ACL reconstruction unless instructed otherwise by surgeon.  You may put as much weight on knee as tolerable.  If given a knee immobilizer postoperatively, usually with an ACL reconstruction, this is for protection with early ambulation until you are steadier on your feet.  It is very important to take off immobilizer to do range of motion and flexion and extension exercises.  You do not have to sleep in the knee immobilizer.  Knee range of motion exercises should be started as early as the day of surgery.  Try to achieve full extension and start working on range of motion and flexion of the knee.  Move the ankle up and down periodically to help reduce swelling as well as reduce blood pooling.  To allow full extension of the knee and prevent blood clots it is very important to put pillows at the level of the mid-calf to the foot.  DO NOT put pillows directly behind knee.  SPECIAL INSTRUCTIONS:  TYLENOL AT 0733AM MAY START PAIN MEDS AFTER 1130AM TODAY   OTC STIOOL SOFTNERS AS NEEDED WHILE TAKING PAIN MEDS                                                            DISCHARGE INSTRUCTIONS  POST-OPERATIVE   Knee Arthroscopic Surgery      General Instructions  You will receive a prescription for physical therapy, unless otherwise instructed by physician.  Physical therapy is imperative especially after ACL reconstruction and some knee arthroscopies for swelling reduction, knee range of motion and strengthening.    [x] Use ice pack on the knee for 20 minutes on and 20 minutes off.  Never place ice directly on the skin.  By following this, you will cool the knee sufficiently.  Avoid getting dressing wet.  To help reduce swelling and minimize throbbing pain, use pillows to keep the knee elevated above the level of the heart, even while sleeping.  Sit with the leg propped up on a footstool or chair with  pillows.  Exercises toes for 10 minutes every hour while awake.  If you are given a prescription for pain medication, take it as prescribed.  If you are able to take over-the-counter anti-inflammatory medications such as Motrin or Aleve, you may take as per package instructions in between the pain medication to help enhance pain control and reduce swelling.  If you are taking the pain medication prescribed, do not take Tylenol too unless you consult with the pharmacist. Generally, the pain medications prescribed have Tylenol in them and too much Tylenol can be harmful.  You should stop the anti-inflammatory medication if you experience any stomach/GI disturbances.  Consult with your physician or your pharmacist before taking over-the-counter pain medications/anti-inflammatories. It is not uncommon to have a fever post-operatively especially in the first 24-48 hours.  Deep breathing and coughing and early ambulation will help.  Anti-inflammatories can be used for fever reduction also.  If unable to urinate 6 to 8 hours after surgery or urinating frequently in small amounts, notify the physician or go to the nearest Emergency Room.  NOTIFY YOUR PHYSICIAN IF YOU EXPERIENCE:  Numbness of toes.  Inability to move toes.  Extreme coldness, paleness or blue dis-coloration of toes.  Any pain other than from the incision, such as swelling of the leg that blocks circulation of the toes.  Follow verbal instructions from your doctor.  Medications per physician instructions as indicated on Discharge Medication Information Sheet.  You should see  ______________________for follow-up care  on   .  Phone number: _________________________  Missing your appointment/follow-up could lead to serious complications.  If you have an emergency and cannot reach your doctor, go to an Emergency Room nearest your home.

## 2024-07-15 NOTE — ANESTHESIA PREPROCEDURE EVALUATION
Anesthesia Evaluation     Patient summary reviewed and Nursing notes reviewed   no history of anesthetic complications:   NPO Solid Status: > 8 hours  NPO Liquid Status: > 2 hours           Airway   Mallampati: II  TM distance: >3 FB  Neck ROM: full  No difficulty expected  Dental      Pulmonary - negative pulmonary ROS and normal exam    breath sounds clear to auscultation  Cardiovascular - negative cardio ROS and normal exam  Exercise tolerance: good (4-7 METS)    Rhythm: regular  Rate: normal        Neuro/Psych- negative ROS  GI/Hepatic/Renal/Endo    (+) obesity, GERD well controlled    Musculoskeletal     Abdominal    Substance History - negative use     OB/GYN negative ob/gyn ROS         Other   arthritis,     ROS/Med Hx Other: PAT Nursing Notes unavailable.               Anesthesia Plan    ASA 2     general     (Patient understands anesthesia not responsible for dental damage.)  intravenous induction     Anesthetic plan, risks, benefits, and alternatives have been provided, discussed and informed consent has been obtained with: patient.  Pre-procedure education provided  Plan discussed with CRNA.    CODE STATUS:

## 2024-07-15 NOTE — ANESTHESIA POSTPROCEDURE EVALUATION
Patient: Jazmin Jennings    Procedure Summary       Date: 07/15/24 Room / Location: [facility] OR [address] [place] [address] OR [facility]    Anesthesia Start: 0814 Anesthesia Stop: 0909    Procedure: Right KNEE ARTHROSCOPY WITH partial medial meniscectomy, partial lateral meniscectomy, chondroplasty (Right: Knee) Diagnosis:       Tear of lateral meniscus of right knee, unspecified tear type, unspecified whether old or current tear, initial encounter      Acute medial meniscus tear of right knee, initial encounter      Primary osteoarthritis of right knee      (Tear of lateral meniscus of right knee, unspecified tear type, unspecified whether old or current tear, initial encounter [S83.281A])      (Acute medial meniscus tear of right knee, initial encounter [S83.241A])      (Primary osteoarthritis of right knee [M17.11])    Surgeons: Austin Barbour MD Provider: Mitchel Rosa MD    Anesthesia Type: general ASA Status: 2            Anesthesia Type: general    Vitals  Vitals Value Taken Time   /80 07/15/24 0950   Temp 36.1 °C (97 °F) 07/15/24 0950   Pulse 49 07/15/24 0950   Resp 18 07/15/24 0950   SpO2 96 % 07/15/24 0950           Post Anesthesia Care and Evaluation    Patient location during evaluation: bedside  Patient participation: complete - patient participated  Level of consciousness: awake and alert  Pain management: adequate    Airway patency: patent  Anesthetic complications: No anesthetic complications  PONV Status: none  Cardiovascular status: acceptable  Respiratory status: acceptable  Hydration status: acceptable    Comments: An Anesthesiologist personally participated in the most demanding procedures (including induction and emergence if applicable) in the anesthesia plan, monitored the course of anesthesia administration at frequent intervals and remained physically present and available for immediate diagnosis and treatment of emergencies.

## 2024-07-15 NOTE — OP NOTE
KNEE ARTHROSCOPY WITH MENISCAL REPAIR  Procedure Report    Patient Name:  Jazmin Jennings  YOB: 1965    Date of Surgery:  7/15/2024     Indications: The patient has failed conservative treatment and wishes to proceed with operative treatment.  We discussed the risk of surgery including bleeding, infection, damage to neurovascular structures, anesthesia complications, continued pain and disability, need for additional procedures among others.  Informed consent was obtained and they wished to proceed.    Pre-op Diagnosis:   Tear of lateral meniscus of right knee, unspecified tear type, unspecified whether old or current tear, initial encounter [S83.281A]  Acute medial meniscus tear of right knee, initial encounter [S83.241A]  Primary osteoarthritis of right knee [M17.11]       Post-Op Diagnosis Codes:     * Tear of lateral meniscus of right knee, unspecified tear type, unspecified whether old or current tear, initial encounter [S83.281A]     * Acute medial meniscus tear of right knee, initial encounter [S83.241A]     * Primary osteoarthritis of right knee [M17.11]    Procedure/CPT® Codes:      Procedure(s):  Right KNEE ARTHROSCOPY WITH partial medial meniscectomy, partial lateral meniscectomy, chondroplasty, lateral retinacular release    Staff:  Surgeon(s):  Austin Barbour MD         Anesthesia: General    Estimated Blood Loss: 10 mL    Implants:    Nothing was implanted during the procedure    Specimen:          None        Findings: Lateral and medial meniscus tear, patellar maltracking and chondromalacia.    Complications: None    Description of Procedure: Operative site was marked in the preoperative holding area.  The patient was brought to the operating room and placed supine on the operating room table.  General anesthesia was given.  All bony prominences were padded.  The operative leg had a nonsterile tourniquet placed on the thigh and was placed in arthroscopic leg lundy.  The opposite  leg was placed in a padded leg lundy and the foot of the bed was lowered.      The patient received preoperative antibiotic.  After formal timeout was held, Esmarch was used to exsanguinate the limb and tourniquet was inflated.  A stab incision was made over the anterolateral aspect of the knee and a trocar was inserted into the knee.  The knee was extended, and diagnostic arthroscopy was performed. Anteromedial portal was made with the spinal needle from outside in.  A stab incision was made.  An arthroscopic shaver was inserted into the knee and the knee was débrided.      Patellofemoral inspection revealed patellofemoral chondromalacia.  There is grade III-IV chondromalacia over the lateral facet of the patella.  Grade II-III chondromalacia over the lateral and central trochlea.  The patella appeared to be laterally tilted and maltracking.  A lateral retinacular release was performed with the hook ablation probe releasing the lateral retinaculum working through the lateral portal from the superior to inferior patella.     The knee was flexed.  The valgus stress was placed to the knee.  A probe was inserted into the medial compartment.  Medial compartment exam revealed small meniscal posterior horn and body peripheral tear.  This is debrided with the straight meniscal biter and motorized shaver for a partial medial meniscectomy removing 2 to 3 mm of meniscus.     At this point attention was turned to the notch in the femur.  The ACL was probed and found to be stable. The PCL was intact.     The lateral compartment findings included degenerative posterior horn and root lateral meniscal tear.  Lateral meniscal body degenerative tear.  This is debrided with the straight meniscal biter and motorized shaver for a partial lateral meniscectomy.  The meniscal root was intact without full-thickness tearing.  There is minimal chondromalacia to the lateral tibiofemoral cartilage.    At this point then fluid was drained from  the knee.  Tourniquet was released.  The incisions were closed with 3-0 nylon in figure-of-8 fashion.  Local anesthetic, 10 mL of Marcaine and 10 mg of morphine, were injected into the knee.  Xeroform, 4x4s, soft roll and an Ace wrap were placed.  The patient awoke from anesthesia in stable condition.  There were no complications.  All counts were correct.  The patient was stable to recovery.        was responsible for performing the following activities: Retraction, Suction, Irrigation, and Placing Dressing and their skilled assistance was necessary for the success of this case.    Austin Barbour MD     Date: 7/15/2024  Time: 09:10 EDT

## 2024-07-30 ENCOUNTER — OFFICE VISIT (OUTPATIENT)
Dept: ORTHOPEDIC SURGERY | Facility: CLINIC | Age: 59
End: 2024-07-30
Payer: OTHER GOVERNMENT

## 2024-07-30 VITALS
DIASTOLIC BLOOD PRESSURE: 62 MMHG | OXYGEN SATURATION: 96 % | SYSTOLIC BLOOD PRESSURE: 102 MMHG | BODY MASS INDEX: 35.44 KG/M2 | HEIGHT: 63 IN | WEIGHT: 200 LBS | HEART RATE: 74 BPM

## 2024-07-30 DIAGNOSIS — Z98.890 S/P RIGHT KNEE ARTHROSCOPY: Primary | ICD-10-CM

## 2024-07-30 PROCEDURE — 99024 POSTOP FOLLOW-UP VISIT: CPT | Performed by: ORTHOPAEDIC SURGERY

## 2024-07-30 NOTE — PROGRESS NOTES
"Chief Complaint  Follow-up of the Right Knee     Subjective      Jazmin Jennings presents to [facility] for a follow up for her right knee. She recently underwent a right knee arthroscopy with partial medial menisectomy, partial lateral meniscectomy and chondroplasty performed on 07/15/24. She presents to the office today for her 2 week post-op appointment where her sutures were removed in the office. She is ambulating today with a cane or walker.     No Known Allergies     Social History     Socioeconomic History    Marital status:    Tobacco Use    Smoking status: Never    Smokeless tobacco: Never   Vaping Use    Vaping status: Never Used    Passive vaping exposure: Yes   Substance and Sexual Activity    Alcohol use: Yes     Comment: socially    Drug use: Never    Sexual activity: Defer        I reviewed the patient's chief complaint, history of present illness, review of systems, past medical history, surgical history, family history, social history, medications, and allergy list.     Review of Systems     Constitutional: Denies fevers, chills, weight loss  Cardiovascular: Denies chest pain, shortness of breath  Skin: Denies rashes, acute skin changes  Neurologic: Denies headache, loss of consciousness  MSK: Right knee pain       Vital Signs:   /62   Pulse 74   Ht 160 cm (63\")   Wt 90.7 kg (200 lb)   SpO2 96%   BMI 35.43 kg/m²            Ortho Exam    Physical Exam  General:Alert. No acute distress   Right lower extremity: sutures were removed today in the office, no signs of infection, no redness or drainage, full extension, flexion to 120 degrees, mild swelling to the superior lateral knee, calf soft, neurovascularly intact, positive EHL, FHL, GS, and TA. Sensation intact to all 5 nerves of the foot.      Procedures    Imaging Results (Most Recent)       None             Result Review :       No results found.           Assessment and Plan     Diagnoses and all " orders for this visit:    1. S/P right knee arthroscopy with partial medial meniscectomy, partial lateral menisectomy and chondroplasty performed on 07/15/24. (Primary)        The patient presents here today for a follow up for her right knee arthroscopy with partial medial meniscectomy, partial lateral menisectomy and chondroplasty performed on 07/15/24.     Incision care discussed with the patient today.     She will start physical therapy and will continue over the counter medications.      Call or return if worsening symptoms.    Follow Up     4 weeks     Patient was given instructions and counseling regarding her condition or for health maintenance advice. Please see specific information pulled into the AVS if appropriate.     Scribed for Austin Barbour MD by Sylvia Dick.  07/30/24   09:33 EDT    I have personally performed the services described in this document as scribed by the above individual and it is both accurate and complete. Austin Barbour MD 07/30/24

## 2024-08-07 ENCOUNTER — PREP FOR SURGERY (OUTPATIENT)
Dept: OTHER | Facility: HOSPITAL | Age: 59
End: 2024-08-07
Payer: OTHER GOVERNMENT

## 2024-08-07 ENCOUNTER — CLINICAL SUPPORT (OUTPATIENT)
Dept: GASTROENTEROLOGY | Facility: CLINIC | Age: 59
End: 2024-08-07
Payer: OTHER GOVERNMENT

## 2024-08-07 DIAGNOSIS — Z12.11 COLON CANCER SCREENING: Primary | ICD-10-CM

## 2024-08-07 RX ORDER — PEG-3350, SODIUM SULFATE, SODIUM CHLORIDE, POTASSIUM CHLORIDE, SODIUM ASCORBATE AND ASCORBIC ACID 7.5-2.691G
KIT ORAL
Qty: 1 EACH | Refills: 0 | Status: SHIPPED | OUTPATIENT
Start: 2024-08-07

## 2024-08-07 NOTE — PROGRESS NOTES
Jazmin William Duty  1965  59 y.o.    Reason for call: Screening Colonoscopy  Prep prescribed: Moviprep  Prep instructions reviewed with patient and sent to patient via [employer]  Is the patient currently on any injectable or oral medications for weight loss or diabetes? No  Clearance needed? No  If yes, what clearance is needed? N/A  Clearance has been requested from n/a  The patient has been scheduled for: Colonoscopy  After your procedure, you will be contacted with results. Please confirm the best phone # to reach the patient: [phone]  Family history of colon cancer? No  If yes, indicate relative: n/a   Tentative Procedure Date: 2024   Family History   Problem Relation Age of Onset    Heart disease Mother     Cancer Father     Cancer Paternal Aunt     Malig Hyperthermia Neg Hx      Past Medical History:   Diagnosis Date    Arthritis     Bunion     GERD (gastroesophageal reflux disease)     Ingrown toenail     PONV (postoperative nausea and vomiting)     Tear of lateral meniscus of right knee      No Known Allergies  Past Surgical History:   Procedure Laterality Date     SECTION      KNEE MENISCAL REPAIR Right 7/15/2024    Procedure: Right KNEE ARTHROSCOPY WITH partial medial meniscectomy, partial lateral meniscectomy, chondroplasty;  Surgeon: Austin Barbour MD;  Location: [facility];  Service: Orthopedics;  Laterality: Right;    OVARIAN CYST REMOVAL      TOE FUSION Left 2021    Procedure: FIRST METATARSOPHALANGEAL JOINT ARTHRODESIS;  Surgeon: Chaitanya Jimenes DPM;  Location: [facility];  Service: Podiatry;  Laterality: Left;     Social History     Socioeconomic History    Marital status:    Tobacco Use    Smoking status: Never    Smokeless tobacco: Never   Vaping Use    Vaping status: Never Used    Passive vaping exposure: Yes   Substance and Sexual Activity    Alcohol use: Yes     Comment: socially    Drug use: Never    Sexual activity: Defer       Current Outpatient  Medications:     MAGNESIUM PO, Take 362 mg by mouth Every Night., Disp: , Rfl:     aspirin (Ecotrin) 325 MG EC tablet, Take 1 tablet by mouth Daily. (Patient not taking: Reported on 8/7/2024), Disp: 14 tablet, Rfl: 0    HYDROcodone-acetaminophen (Norco) 7.5-325 MG per tablet, Take 1 tablet by mouth Every 4 (Four) Hours As Needed for Moderate Pain., Disp: 30 tablet, Rfl: 0    sodium-potassium-magnesium sulfates (SUPREP) 17.5-3.13-1.6 GM/177ML solution oral solution, Take as directed, Disp: 354 mL, Rfl: 0    vitamin D3 (Vitamin D) 125 MCG (5000 UT) capsule capsule, Take 1 capsule by mouth Daily. (Patient not taking: Reported on 8/7/2024), Disp: 90 capsule, Rfl: 1

## 2024-09-05 ENCOUNTER — OFFICE VISIT (OUTPATIENT)
Dept: FAMILY MEDICINE CLINIC | Facility: CLINIC | Age: 59
End: 2024-09-05
Payer: OTHER GOVERNMENT

## 2024-09-05 VITALS
BODY MASS INDEX: 36.18 KG/M2 | HEIGHT: 63 IN | WEIGHT: 204.2 LBS | HEART RATE: 71 BPM | SYSTOLIC BLOOD PRESSURE: 124 MMHG | TEMPERATURE: 97.3 F | OXYGEN SATURATION: 96 % | DIASTOLIC BLOOD PRESSURE: 72 MMHG

## 2024-09-05 DIAGNOSIS — R68.84 JAW PAIN: Primary | ICD-10-CM

## 2024-09-05 DIAGNOSIS — G44.209 ACUTE NON INTRACTABLE TENSION-TYPE HEADACHE: ICD-10-CM

## 2024-09-05 DIAGNOSIS — R05.3 CHRONIC COUGH: ICD-10-CM

## 2024-09-05 PROCEDURE — 96372 THER/PROPH/DIAG INJ SC/IM: CPT | Performed by: NURSE PRACTITIONER

## 2024-09-05 PROCEDURE — 99214 OFFICE O/P EST MOD 30 MIN: CPT | Performed by: NURSE PRACTITIONER

## 2024-09-05 RX ORDER — BACLOFEN 20 MG/1
20 TABLET ORAL NIGHTLY PRN
Qty: 30 TABLET | Refills: 0 | Status: SHIPPED | OUTPATIENT
Start: 2024-09-05

## 2024-09-05 RX ORDER — PREDNISONE 50 MG/1
50 TABLET ORAL DAILY
Qty: 5 TABLET | Refills: 0 | Status: SHIPPED | OUTPATIENT
Start: 2024-09-05 | End: 2024-09-10

## 2024-09-05 RX ORDER — KETOROLAC TROMETHAMINE 30 MG/ML
30 INJECTION, SOLUTION INTRAMUSCULAR; INTRAVENOUS ONCE
Status: COMPLETED | OUTPATIENT
Start: 2024-09-05 | End: 2024-09-05

## 2024-09-05 RX ADMIN — KETOROLAC TROMETHAMINE 30 MG: 30 INJECTION, SOLUTION INTRAMUSCULAR; INTRAVENOUS at 09:43

## 2024-09-05 NOTE — PROGRESS NOTES
"Chief Complaint  Jaw Pain (Started popping over 2 years ago and pain x one month )    Subjective            Jazmin Jennings is a 59 y.o. female who presents to [facility]   History of Present Illness  She is here today with complaints of a headache and jaw pain.  She states she has been having this for about 1 month.  She states she went to the dentist and did have x-rays and they said all was within normal limits.  She thinks she has TMJ.  She states she has tried some mouthguard's but they all make her gag.  She has tried over-the-counter ibuprofen, Aleve and aspirin without any relief.  She states she has tried ice packs and heating pad.  She has had a muscle relaxer tizanidine in the past and states it did not seem to help.    She is also complaining of a chronic cough that is been ongoing for about 3 years.  She has never smoked but her  smokes.      Tobacco Use: Low Risk  (9/5/2024)    Patient History     Smoking Tobacco Use: Never     Smokeless Tobacco Use: Never     Passive Exposure: Not on file      E-cigarette/Vaping    E-cigarette/Vaping Use Never User     Passive Exposure Yes     Counseling Given Yes      E-cigarette/Vaping Substances    Nicotine No     THC No     CBD No     Flavoring No      E-cigarette/Vaping Devices    Disposable No     Pre-filled or Refillable Cartridge No     Refillable Tank No     Pre-filled Pod No        Alcohol Use: Not on file         Objective   Vital Signs:   Vitals:    09/05/24 0922 09/05/24 0926   BP: 146/89 124/72   BP Location: Left arm    Patient Position: Sitting    Cuff Size: Adult    Pulse: 71    Temp: 97.3 °F (36.3 °C)    SpO2: 96%    Weight: 92.6 kg (204 lb 3.2 oz)    Height: 160 cm (63\")      Body mass index is 36.17 kg/m².    Wt Readings from Last 3 Encounters:   09/05/24 92.6 kg (204 lb 3.2 oz)   07/30/24 90.7 kg (200 lb)   07/15/24 91.2 kg (201 lb 1 oz)     BP Readings from Last 3 Encounters:   09/05/24 124/72   07/30/24 " "102/62   07/15/24 123/80       Health Maintenance   Topic Date Due    COLORECTAL CANCER SCREENING  09/12/2024 (Originally 1965)    ZOSTER VACCINE (1 of 2) 01/20/2025 (Originally 4/28/2013)    INFLUENZA VACCINE  03/31/2025 (Originally 8/1/2024)    COVID-19 Vaccine (1 - 2023-24 season) 09/05/2025 (Originally 9/1/2024)    ANNUAL PHYSICAL  01/23/2025    BMI FOLLOWUP  07/30/2025    MAMMOGRAM  02/27/2026    PAP SMEAR  09/20/2026    TDAP/TD VACCINES (2 - Td or Tdap) 01/23/2034    HEPATITIS C SCREENING  Completed    Pneumococcal Vaccine 0-64  Aged Out       /72   Pulse 71   Temp 97.3 °F (36.3 °C)   Ht 160 cm (63\")   Wt 92.6 kg (204 lb 3.2 oz)   SpO2 96%   BMI 36.17 kg/m²       Current Outpatient Medications:     MAGNESIUM PO, Take 362 mg by mouth Every Night., Disp: , Rfl:     baclofen (LIORESAL) 20 MG tablet, Take 1 tablet by mouth At Night As Needed (jaw pain)., Disp: 30 tablet, Rfl: 0    PEG-KCl-NaCl-NaSulf-Na Asc-C (MOVIPREP) 100 g reconstituted solution powder, Take as directed, Disp: 1 each, Rfl: 0    predniSONE (DELTASONE) 50 MG tablet, Take 1 tablet by mouth Daily for 5 days. Indications: jaw pain, Disp: 5 tablet, Rfl: 0  No current facility-administered medications for this visit.   Past Medical History:   Diagnosis Date    Arthritis     Bunion     GERD (gastroesophageal reflux disease)     Ingrown toenail     PONV (postoperative nausea and vomiting)     Tear of lateral meniscus of right knee         Physical Exam  Vitals reviewed.   Constitutional:       Appearance: Normal appearance. She is well-developed. She is obese.   HENT:      Head:      Jaw: Tenderness present. No swelling, pain on movement or malocclusion.      Comments: Right jaw tenderness     Mouth/Throat:      Mouth: Mucous membranes are moist.      Dentition: Normal dentition. No dental tenderness, gingival swelling or dental caries.   Neck:      Thyroid: No thyroid mass, thyromegaly or thyroid tenderness.   Cardiovascular:      Rate " and Rhythm: Normal rate and regular rhythm.      Heart sounds: No murmur heard.     No friction rub. No gallop.   Pulmonary:      Effort: Pulmonary effort is normal.      Breath sounds: Normal breath sounds. No wheezing or rhonchi.   Lymphadenopathy:      Cervical: No cervical adenopathy.   Skin:     General: Skin is warm and dry.   Neurological:      Mental Status: She is alert and oriented to person, place, and time.      Cranial Nerves: No cranial nerve deficit.   Psychiatric:         Mood and Affect: Mood and affect normal.         Behavior: Behavior normal.         Thought Content: Thought content normal. Thought content does not include homicidal or suicidal ideation.         Judgment: Judgment normal.          Result Review :    The following data was reviewed by: FATIMAH Beckman on 09/05/2024:    CMP          1/23/2024    09:01   CMP   Glucose 98    BUN 19    Creatinine 0.89    EGFR 74.3    Sodium 141    Potassium 4.5    Chloride 103    Calcium 9.5    Total Protein 7.6    Albumin 4.6    Globulin 3.0    Total Bilirubin 0.3    Alkaline Phosphatase 63    AST (SGOT) 27    ALT (SGPT) 32    Albumin/Globulin Ratio 1.5    BUN/Creatinine Ratio 21.3    Anion Gap 13.1         Assessment & Plan  Jaw pain  I will prescribe her prednisone 50 mg daily for 5 days, advised to take with food.  Advised her to follow a soft food diet until healed.  Advised to not take any over-the-counter NSAIDs while taking prednisone but may start taking an NSAID after she finishes the prednisone.  I will also start her on baclofen 20 mg at bedtime.  Acute non intractable tension-type headache  Headaches are newly identified.    Plan:  Tension headache due to jaw pain, will give Toradol 30 mg IM in the office today and starting on prednisone.       Followup  as needed.  Chronic cough  I will have her get a chest x-ray.    Orders Placed This Encounter   Procedures    XR Chest PA & Lateral     New Medications Ordered This Visit    Medications    ketorolac (TORADOL) injection 30 mg    predniSONE (DELTASONE) 50 MG tablet     Sig: Take 1 tablet by mouth Daily for 5 days. Indications: jaw pain     Dispense:  5 tablet     Refill:  0    baclofen (LIORESAL) 20 MG tablet     Sig: Take 1 tablet by mouth At Night As Needed (jaw pain).     Dispense:  30 tablet     Refill:  0           Diagnosis Plan   1. Jaw pain  ketorolac (TORADOL) injection 30 mg    predniSONE (DELTASONE) 50 MG tablet    baclofen (LIORESAL) 20 MG tablet      2. Acute non intractable tension-type headache  ketorolac (TORADOL) injection 30 mg    predniSONE (DELTASONE) 50 MG tablet      3. Chronic cough  XR Chest PA & Lateral            FOLLOW UP  Return if symptoms worsen or fail to improve.  Patient was given instructions and counseling regarding her condition or for health maintenance advice. Please see specific information pulled into the AVS if appropriate.       CURRENT & DISCONTINUED MEDICATIONS  Current Outpatient Medications   Medication Instructions    baclofen (LIORESAL) 20 mg, Oral, Nightly PRN    MAGNESIUM  mg, Oral, Nightly    PEG-KCl-NaCl-NaSulf-Na Asc-C (MOVIPREP) 100 g reconstituted solution powder Take as directed    predniSONE (DELTASONE) 50 mg, Oral, Daily       Medications Discontinued During This Encounter   Medication Reason    HYDROcodone-acetaminophen (Norco) 7.5-325 MG per tablet *Therapy completed    sodium-potassium-magnesium sulfates (SUPREP) 17.5-3.13-1.6 GM/177ML solution oral solution *Therapy completed    aspirin (Ecotrin) 325 MG EC tablet *Therapy completed    vitamin D3 (Vitamin D) 125 MCG (5000 UT) capsule capsule *Therapy completed        Parts of this note are electronic transcriptions/translations of spoken language to printed text using the Dragon Dictation system.    Maida Peoples, APRN  09/05/24  09:44 EDT

## 2024-09-16 ENCOUNTER — ANESTHESIA EVENT (OUTPATIENT)
Dept: GASTROENTEROLOGY | Facility: HOSPITAL | Age: 59
End: 2024-09-16
Payer: OTHER GOVERNMENT

## 2024-09-17 ENCOUNTER — HOSPITAL ENCOUNTER (OUTPATIENT)
Facility: HOSPITAL | Age: 59
Setting detail: HOSPITAL OUTPATIENT SURGERY
Discharge: HOME | End: 2024-09-17
Attending: INTERNAL MEDICINE | Admitting: INTERNAL MEDICINE
Payer: OTHER GOVERNMENT

## 2024-09-17 ENCOUNTER — ANESTHESIA (OUTPATIENT)
Dept: GASTROENTEROLOGY | Facility: HOSPITAL | Age: 59
End: 2024-09-17
Payer: OTHER GOVERNMENT

## 2024-09-17 VITALS
WEIGHT: 200.18 LBS | TEMPERATURE: 96.4 F | HEART RATE: 69 BPM | DIASTOLIC BLOOD PRESSURE: 84 MMHG | SYSTOLIC BLOOD PRESSURE: 131 MMHG | RESPIRATION RATE: 19 BRPM | OXYGEN SATURATION: 94 % | BODY MASS INDEX: 35.46 KG/M2

## 2024-09-17 DIAGNOSIS — Z12.11 COLON CANCER SCREENING: ICD-10-CM

## 2024-09-17 PROCEDURE — 25010000002 PROPOFOL 10 MG/ML EMULSION

## 2024-09-17 PROCEDURE — 25810000003 LACTATED RINGERS PER 1000 ML: Performed by: NURSE ANESTHETIST, CERTIFIED REGISTERED

## 2024-09-17 PROCEDURE — 88305 TISSUE EXAM BY PATHOLOGIST: CPT | Performed by: INTERNAL MEDICINE

## 2024-09-17 RX ORDER — LIDOCAINE HYDROCHLORIDE 20 MG/ML
INJECTION, SOLUTION EPIDURAL; INFILTRATION; INTRACAUDAL; PERINEURAL AS NEEDED
Status: DISCONTINUED | OUTPATIENT
Start: 2024-09-17 | End: 2024-09-17 | Stop reason: SURG

## 2024-09-17 RX ORDER — PROPOFOL 10 MG/ML
VIAL (ML) INTRAVENOUS AS NEEDED
Status: DISCONTINUED | OUTPATIENT
Start: 2024-09-17 | End: 2024-09-17 | Stop reason: SURG

## 2024-09-17 RX ORDER — SODIUM CHLORIDE, SODIUM LACTATE, POTASSIUM CHLORIDE, CALCIUM CHLORIDE 600; 310; 30; 20 MG/100ML; MG/100ML; MG/100ML; MG/100ML
30 INJECTION, SOLUTION INTRAVENOUS CONTINUOUS
Status: DISCONTINUED | OUTPATIENT
Start: 2024-09-17 | End: 2024-09-17 | Stop reason: HOSPADM

## 2024-09-17 RX ADMIN — SODIUM CHLORIDE, POTASSIUM CHLORIDE, SODIUM LACTATE AND CALCIUM CHLORIDE 30 ML/HR: 600; 310; 30; 20 INJECTION, SOLUTION INTRAVENOUS at 06:43

## 2024-09-17 RX ADMIN — PROPOFOL 100 MG: 10 INJECTION, EMULSION INTRAVENOUS at 07:33

## 2024-09-17 RX ADMIN — PROPOFOL 175 MCG/KG/MIN: 10 INJECTION, EMULSION INTRAVENOUS at 07:34

## 2024-09-17 RX ADMIN — LIDOCAINE HYDROCHLORIDE 50 MG: 20 INJECTION, SOLUTION EPIDURAL; INFILTRATION; INTRACAUDAL; PERINEURAL at 07:33

## 2024-09-18 LAB
CYTO UR: NORMAL
LAB AP CASE REPORT: NORMAL
LAB AP CLINICAL INFORMATION: NORMAL
PATH REPORT.FINAL DX SPEC: NORMAL
PATH REPORT.GROSS SPEC: NORMAL

## 2024-09-19 ENCOUNTER — OFFICE VISIT (OUTPATIENT)
Dept: ORTHOPEDIC SURGERY | Facility: CLINIC | Age: 59
End: 2024-09-19
Payer: OTHER GOVERNMENT

## 2024-09-19 ENCOUNTER — TELEPHONE (OUTPATIENT)
Dept: GASTROENTEROLOGY | Facility: CLINIC | Age: 59
End: 2024-09-19
Payer: OTHER GOVERNMENT

## 2024-09-19 ENCOUNTER — HOSPITAL ENCOUNTER (OUTPATIENT)
Dept: GENERAL RADIOLOGY | Facility: HOSPITAL | Age: 59
Discharge: HOME | End: 2024-09-19
Admitting: NURSE PRACTITIONER
Payer: OTHER GOVERNMENT

## 2024-09-19 VITALS
WEIGHT: 200 LBS | DIASTOLIC BLOOD PRESSURE: 88 MMHG | OXYGEN SATURATION: 97 % | HEART RATE: 62 BPM | HEIGHT: 63 IN | SYSTOLIC BLOOD PRESSURE: 142 MMHG | BODY MASS INDEX: 35.44 KG/M2

## 2024-09-19 DIAGNOSIS — R05.3 CHRONIC COUGH: ICD-10-CM

## 2024-09-19 DIAGNOSIS — Z98.890 S/P RIGHT KNEE ARTHROSCOPY: Primary | ICD-10-CM

## 2024-09-19 PROCEDURE — 71046 X-RAY EXAM CHEST 2 VIEWS: CPT

## 2024-09-19 RX ORDER — DICLOFENAC SODIUM 75 MG/1
75 TABLET, DELAYED RELEASE ORAL 2 TIMES DAILY
Qty: 60 TABLET | Refills: 2 | Status: SHIPPED | OUTPATIENT
Start: 2024-09-19

## 2024-09-23 ENCOUNTER — PREP FOR SURGERY (OUTPATIENT)
Dept: OTHER | Facility: HOSPITAL | Age: 59
End: 2024-09-23
Payer: OTHER GOVERNMENT

## 2024-09-23 DIAGNOSIS — Z12.11 COLON CANCER SCREENING: Primary | ICD-10-CM

## 2024-09-23 RX ORDER — POLYETHYLENE GLYCOL 3350, SODIUM SULFATE ANHYDROUS, SODIUM BICARBONATE, SODIUM CHLORIDE, POTASSIUM CHLORIDE 236; 22.74; 6.74; 5.86; 2.97 G/4L; G/4L; G/4L; G/4L; G/4L
4000 POWDER, FOR SOLUTION ORAL ONCE
Qty: 4000 ML | Refills: 0 | Status: SHIPPED | OUTPATIENT
Start: 2024-09-23 | End: 2024-09-23

## 2024-09-30 ENCOUNTER — TELEPHONE (OUTPATIENT)
Dept: FAMILY MEDICINE CLINIC | Facility: CLINIC | Age: 59
End: 2024-09-30

## 2024-09-30 DIAGNOSIS — Z98.890 S/P RIGHT KNEE ARTHROSCOPY: ICD-10-CM

## 2024-09-30 DIAGNOSIS — R68.84 JAW PAIN: ICD-10-CM

## 2024-09-30 NOTE — TELEPHONE ENCOUNTER
Caller: Jazmin Jennings    Relationship: Self    Best call back number: [phone]     What is the best time to reach you: ANY    Who are you requesting to speak with (clinical staff, provider,  specific staff member): CLINICAL      What was the call regarding: PATIENT STATES SHE WAS TOLD TO CALL BACK IF HER TMJ RETURNED-- SHE HAS FINISHED ALL MEDICATIONS AND IT HAS RETURNED    PLEASE ADVISE     Is it okay if the provider responds through MyChart: YES

## 2024-10-01 RX ORDER — BACLOFEN 20 MG/1
20 TABLET ORAL NIGHTLY PRN
Qty: 30 TABLET | Refills: 2 | Status: SHIPPED | OUTPATIENT
Start: 2024-10-01

## 2024-10-01 RX ORDER — DICLOFENAC SODIUM 75 MG/1
75 TABLET, DELAYED RELEASE ORAL 2 TIMES DAILY
Qty: 60 TABLET | Refills: 2 | Status: SHIPPED | OUTPATIENT
Start: 2024-10-01

## 2024-10-01 NOTE — TELEPHONE ENCOUNTER
Did the jaw pain improved while she was on the anti-inflammatory (diclofenac) and the baclofen (muscle relaxer)?  If so, she can continue these medications and we can send in refills if needed.  I also recommend trying avoid anything hard to eat that could set it off.  She may want to check back with her dentist also to see if there is anything else they can do for her.

## 2024-10-02 ENCOUNTER — TELEPHONE (OUTPATIENT)
Dept: FAMILY MEDICINE CLINIC | Facility: CLINIC | Age: 59
End: 2024-10-02

## 2024-10-02 DIAGNOSIS — R68.84 JAW PAIN: Primary | ICD-10-CM

## 2024-10-02 NOTE — TELEPHONE ENCOUNTER
Caller: Jazmin Jennings    Relationship: Self    Best call back number: [phone]     What is the medical concern/diagnosis: JAW PAIN    [facility]     Any additional details: PATIENT STATES THE PAIN MEDICATIONS ARE NOT HELPING WITH HER JAW PAIN. PATIENT STATES SHE WENT TO HER DENTIST AND THEY COULDN'T FIND ANYTHING WRONG. PATIENT STATES SHE WOULD LIKE A REFERRAL TO A SPECIALIST IF POSSIBLE BECAUSE THE PAIN IS SEVERE.

## 2024-10-03 RX ORDER — PREDNISONE 50 MG/1
50 TABLET ORAL DAILY
Qty: 5 TABLET | Refills: 0 | Status: SHIPPED | OUTPATIENT
Start: 2024-10-03 | End: 2024-10-08

## 2024-10-03 NOTE — TELEPHONE ENCOUNTER
I am going to order an MRI of the temporal mandibular joints to see what is going on.  I am also going to put her on a steroid prednisone 50 mg daily for 5 days, advised to take with food.  She can continue the diclofenac and baclofen.

## 2024-10-21 ENCOUNTER — TELEPHONE (OUTPATIENT)
Dept: FAMILY MEDICINE CLINIC | Facility: CLINIC | Age: 59
End: 2024-10-21
Payer: OTHER GOVERNMENT

## 2024-10-21 RX ORDER — PREDNISONE 50 MG/1
50 TABLET ORAL DAILY
Qty: 5 TABLET | Refills: 0 | Status: SHIPPED | OUTPATIENT
Start: 2024-10-21

## 2024-10-21 NOTE — TELEPHONE ENCOUNTER
Pt contacted office and is in extreme jaw pain. She is wondering if you can send her in a Rx as she is in to much pain to go out and be seen at  as suggested. I also suggested telehealth via [employer] but pt was not interested.

## 2024-11-11 ENCOUNTER — TELEPHONE (OUTPATIENT)
Dept: FAMILY MEDICINE CLINIC | Facility: CLINIC | Age: 59
End: 2024-11-11

## 2024-11-11 DIAGNOSIS — F40.240 CLAUSTROPHOBIA: Primary | ICD-10-CM

## 2024-11-11 RX ORDER — DIAZEPAM 5 MG/1
TABLET ORAL
Qty: 2 TABLET | Refills: 0 | Status: SHIPPED | OUTPATIENT
Start: 2024-11-11

## 2024-11-11 NOTE — TELEPHONE ENCOUNTER
Caller: Jazmin Jennings    Relationship: Self    Best call back number: [phone]    What medication are you requesting: VALIUM OR SOMETHING TO CALM NERVES    What are your current symptoms: N/A    How long have you been experiencing symptoms: N/A    Have you had these symptoms before:    [] Yes  [] No    Have you been treated for these symptoms before:   [] Yes  [] No    If a prescription is needed, what is your preferred pharmacy and phone number: [id number] [place] [id number] [facility] [phone] [facility] [phone]      Additional notes:  PATIENT HAS CLOSED MRI SCHEDULED FOR TOMORROW. PATIENT IS CLAUSTROPHOBIC. PLEASE SEND NEW PRESCRIPTION TO PHARMACY ASAP TODAY.

## 2024-11-11 NOTE — TELEPHONE ENCOUNTER
I attempted to call patient to discuss with her that I am calling her in Valium but I did not get an answer.  Misael reviewed and is consistent.  I will send her in Valium to take 1 tablet 1 hour prior to her MRI and she may repeat 1 dose if needed.

## 2024-11-11 NOTE — TELEPHONE ENCOUNTER
Please call patient and let her know that I sent in Valium, she needs to take 1 about 1 hour prior to her MRI appointment and she may repeat 1 dose if needed.  She will have to have a  with taking the Valium.

## 2024-11-12 ENCOUNTER — HOSPITAL ENCOUNTER (OUTPATIENT)
Dept: MRI IMAGING | Facility: HOSPITAL | Age: 59
Discharge: HOME | End: 2024-11-12
Admitting: NURSE PRACTITIONER
Payer: OTHER GOVERNMENT

## 2024-11-12 DIAGNOSIS — R68.84 JAW PAIN: ICD-10-CM

## 2024-11-12 PROCEDURE — 70336 MAGNETIC IMAGE JAW JOINT: CPT

## 2024-11-14 ENCOUNTER — HOSPITAL ENCOUNTER (OUTPATIENT)
Dept: GENERAL RADIOLOGY | Facility: HOSPITAL | Age: 59
Discharge: HOME | End: 2024-11-14
Payer: OTHER GOVERNMENT

## 2024-11-14 DIAGNOSIS — R68.84 JAW PAIN: ICD-10-CM

## 2024-11-14 DIAGNOSIS — M47.22 CERVICAL RADICULOPATHY DUE TO DEGENERATIVE JOINT DISEASE OF SPINE: ICD-10-CM

## 2024-11-14 DIAGNOSIS — M26.609 TEMPORAL MANDIBULAR JOINT DISORDER: ICD-10-CM

## 2024-11-14 DIAGNOSIS — R68.84 JAW PAIN: Primary | ICD-10-CM

## 2024-11-14 PROCEDURE — 72050 X-RAY EXAM NECK SPINE 4/5VWS: CPT

## 2024-11-14 PROCEDURE — 72072 X-RAY EXAM THORAC SPINE 3VWS: CPT

## 2024-12-21 DIAGNOSIS — Z98.890 S/P RIGHT KNEE ARTHROSCOPY: ICD-10-CM

## 2024-12-23 RX ORDER — DICLOFENAC SODIUM 75 MG/1
75 TABLET, DELAYED RELEASE ORAL 2 TIMES DAILY
Qty: 60 TABLET | Refills: 2 | Status: SHIPPED | OUTPATIENT
Start: 2024-12-23

## 2025-01-13 ENCOUNTER — TELEPHONE (OUTPATIENT)
Dept: GASTROENTEROLOGY | Facility: CLINIC | Age: 60
End: 2025-01-13
Payer: OTHER GOVERNMENT

## 2025-01-13 NOTE — TELEPHONE ENCOUNTER
Caller: Jazmin Jennings    Relationship to patient: Self    Best call back number: [phone]     Patient is needing: PT CALLING TO CANCEL PROCEDURE SCHEDULED 1/16/25. UNABLE TO WT. PT WILL CALL TO RESCHEDULE AT LATER DATE, PLEASE CANCEL FOR NOW.

## 2025-01-13 NOTE — TELEPHONE ENCOUNTER
Called patient to R/s  No answer, left vm and call back number     Certified letter mailed to patient     [phone]

## 2025-01-23 ENCOUNTER — OFFICE VISIT (OUTPATIENT)
Dept: FAMILY MEDICINE CLINIC | Facility: CLINIC | Age: 60
End: 2025-01-23
Payer: OTHER GOVERNMENT

## 2025-01-23 VITALS
DIASTOLIC BLOOD PRESSURE: 92 MMHG | HEIGHT: 63 IN | BODY MASS INDEX: 36.68 KG/M2 | OXYGEN SATURATION: 96 % | SYSTOLIC BLOOD PRESSURE: 136 MMHG | WEIGHT: 207 LBS | HEART RATE: 78 BPM | TEMPERATURE: 97.6 F

## 2025-01-23 DIAGNOSIS — G89.29 CHRONIC JAW PAIN: Primary | ICD-10-CM

## 2025-01-23 DIAGNOSIS — G50.0 TRIGEMINAL NEURALGIA: ICD-10-CM

## 2025-01-23 DIAGNOSIS — R68.84 CHRONIC JAW PAIN: Primary | ICD-10-CM

## 2025-01-23 DIAGNOSIS — K12.2 CELLULITIS OF BUCCAL SPACE OF MOUTH: ICD-10-CM

## 2025-01-23 DIAGNOSIS — F40.240 CLAUSTROPHOBIA: ICD-10-CM

## 2025-01-23 PROCEDURE — 99214 OFFICE O/P EST MOD 30 MIN: CPT | Performed by: NURSE PRACTITIONER

## 2025-01-23 RX ORDER — GABAPENTIN 300 MG/1
300 CAPSULE ORAL 3 TIMES DAILY
Qty: 42 CAPSULE | Refills: 0 | Status: SHIPPED | OUTPATIENT
Start: 2025-01-23 | End: 2025-02-06

## 2025-01-23 RX ORDER — AMOXICILLIN 875 MG/1
875 TABLET, COATED ORAL 2 TIMES DAILY
Qty: 20 TABLET | Refills: 0 | Status: SHIPPED | OUTPATIENT
Start: 2025-01-23 | End: 2025-02-02

## 2025-01-23 NOTE — PROGRESS NOTES
"Chief Complaint  Jaw Pain (Ongoing issue )    Subjective            Jazmin Jennings is a 59 y.o. female who presents to [facility]   History of Present Illness  She is here today with complaints of continued jaw pain. She has had x-rays and MRI of the temporomandibular joints which were unremarkable.  Cervical spine x-ray does show degenerative changes.  An MRI of the cervical spine has been ordered but patient states she has not been able to schedule it due to her schedules.      We need to look for possible trigeminal neuralgia with an IAC to look at the nerve roots. She does have claustrophobia. She states she only had to take 1 valium last time.     She also now has some swelling in her right lower jaw.  She states where she had had some dental workup done years ago that is swollen underneath and painful.  She is trying to get in with a dentist.    PHQ-2 Depression Screening  Little interest or pleasure in doing things? Not at all   Feeling down, depressed, or hopeless? Not at all   PHQ-2 Total Score 0         Tobacco Use: Low Risk  (1/23/2025)    Patient History     Smoking Tobacco Use: Never     Smokeless Tobacco Use: Never     Passive Exposure: Not on file      E-cigarette/Vaping    E-cigarette/Vaping Use Never User     Passive Exposure Yes     Counseling Given Yes      E-cigarette/Vaping Substances    Nicotine No     THC No     CBD No     Flavoring No      E-cigarette/Vaping Devices    Disposable No     Pre-filled or Refillable Cartridge No     Refillable Tank No     Pre-filled Pod No        Alcohol Use: Not on file         Objective   Vital Signs:   Vitals:    01/23/25 0945 01/23/25 0950   BP: 150/96 136/92   BP Location: Left arm    Patient Position: Sitting    Cuff Size: Adult    Pulse: 78    Temp: 97.6 °F (36.4 °C)    SpO2: 96%    Weight: 93.9 kg (207 lb)    Height: 160 cm (63\")    PainSc:   7    PainLoc: Jaw      Body mass index is 36.67 kg/m².    Wt Readings from Last 3 " "Encounters:   01/23/25 93.9 kg (207 lb)   09/19/24 90.7 kg (200 lb)   09/17/24 90.8 kg (200 lb 2.8 oz)     BP Readings from Last 3 Encounters:   01/23/25 136/92   09/19/24 142/88   09/17/24 131/84       Health Maintenance   Topic Date Due    ZOSTER VACCINE (1 of 2) Never done    ANNUAL PHYSICAL  01/23/2025    INFLUENZA VACCINE  03/31/2025 (Originally 7/1/2024)    COVID-19 Vaccine (1 - 2024-25 season) 09/05/2025 (Originally 9/1/2024)    BMI FOLLOWUP  07/30/2025    MAMMOGRAM  02/27/2026    PAP SMEAR  09/20/2026    TDAP/TD VACCINES (2 - Td or Tdap) 01/23/2034    COLORECTAL CANCER SCREENING  09/17/2034    HEPATITIS C SCREENING  Completed    Pneumococcal Vaccine 0-64  Aged Out       /92   Pulse 78   Temp 97.6 °F (36.4 °C)   Ht 160 cm (63\")   Wt 93.9 kg (207 lb)   SpO2 96%   BMI 36.67 kg/m²       Current Outpatient Medications:     MAGNESIUM PO, Take 362 mg by mouth Every Night., Disp: , Rfl:     amoxicillin (AMOXIL) 875 MG tablet, Take 1 tablet by mouth 2 (Two) Times a Day for 10 days., Disp: 20 tablet, Rfl: 0    gabapentin (NEURONTIN) 300 MG capsule, Take 1 capsule by mouth 3 (Three) Times a Day for 14 days. Indications: Trigeminal Nerve Pain, Disp: 42 capsule, Rfl: 0   Past Medical History:   Diagnosis Date    Arthritis     Bunion     GERD (gastroesophageal reflux disease)     Ingrown toenail     PONV (postoperative nausea and vomiting)     Tear of lateral meniscus of right knee         Physical Exam  Vitals reviewed.   Constitutional:       Appearance: Normal appearance. She is well-developed.   HENT:      Head:      Jaw: Tenderness and pain on movement present.      Mouth/Throat:      Mouth: Mucous membranes are moist.      Dentition: Dental tenderness and gingival swelling present.   Neck:      Thyroid: No thyroid mass, thyromegaly or thyroid tenderness.   Cardiovascular:      Rate and Rhythm: Normal rate and regular rhythm.      Heart sounds: No murmur heard.     No friction rub. No gallop.   Pulmonary: "      Effort: Pulmonary effort is normal.      Breath sounds: Normal breath sounds. No wheezing or rhonchi.   Lymphadenopathy:      Cervical: No cervical adenopathy.   Skin:     General: Skin is warm and dry.   Neurological:      Mental Status: She is alert and oriented to person, place, and time.      Cranial Nerves: No cranial nerve deficit.   Psychiatric:         Mood and Affect: Mood and affect normal.         Behavior: Behavior normal.         Thought Content: Thought content normal. Thought content does not include homicidal or suicidal ideation.         Judgment: Judgment normal.          Result Review :    The following data was reviewed by: FATIMAH Beckman on 01/23/2025:         XR Spine Cervical Complete 4 or 5 View    Result Date: 11/18/2024  Impression: Degenerative change. No acute osseous abnormalities are identified. Electronically Signed: Lincoln Wilson MD  11/18/2024 12:38 PM EST  Workstation ID: [id number]    XR Spine Thoracic 3 View    Result Date: 11/18/2024  Impression: Mild degenerative change and minimal scoliosis. No acute osseous abnormalities are identified. Electronically Signed: Lincoln Wilson MD  11/18/2024 12:28 PM EST  Workstation ID: [id number]    MRI Temporomandibular Joints    Result Date: 11/13/2024  Impression: 1.The articular discs of both the temporomandibular joints demonstrate some intermediate signal, and there appears to be diminution of the posterior band of the right articular disc, suspected to be related to degenerative changes. 2.No definite findings of disc malalignment or other etiology for patient's symptoms between the open and closed mouth views on these images. Electronically Signed: Angle Mcfarlane  11/13/2024 8:18 PM EST  Workstation ID: [id number]    Assessment & Plan  Chronic jaw pain    Orders:    MRI Internal Auditory Canal With Contrast; Future    Trigeminal neuralgia  Highly suspect trigeminal neuralgia.  Will get MRI internal auditory canal with contrast.   We also discussed starting gabapentin to see if this would help.  We discussed potential side effects to gabapentin, advised not to drive while taking gabapentin.  Misael reviewed and is consistent.  Orders:    MRI Internal Auditory Canal With Contrast; Future    gabapentin (NEURONTIN) 300 MG capsule; Take 1 capsule by mouth 3 (Three) Times a Day for 14 days. Indications: Trigeminal Nerve Pain    Claustrophobia  She has claustrophobia with enclosed places and did fine with just 1 Valium on her last MRI.  She thinks she still has the other Valium but will call me if she needs a refill.       Cellulitis of buccal space of mouth  She does have an infection going on now also so I will start her on amoxicillin.  Advised her to get in with a dentist.  Orders:    amoxicillin (AMOXIL) 875 MG tablet; Take 1 tablet by mouth 2 (Two) Times a Day for 10 days.         Diagnosis Plan   1. Chronic jaw pain  MRI Internal Auditory Canal With Contrast      2. Trigeminal neuralgia  MRI Internal Auditory Canal With Contrast    gabapentin (NEURONTIN) 300 MG capsule      3. Claustrophobia        4. Cellulitis of buccal space of mouth  amoxicillin (AMOXIL) 875 MG tablet            FOLLOW UP  Return in about 1 week (around 1/30/2025) for Recheck.  Patient was given instructions and counseling regarding her condition or for health maintenance advice. Please see specific information pulled into the AVS if appropriate.       CURRENT & DISCONTINUED MEDICATIONS  Current Outpatient Medications   Medication Instructions    amoxicillin (AMOXIL) 875 mg, Oral, 2 Times Daily    gabapentin (NEURONTIN) 300 mg, Oral, 3 Times Daily    MAGNESIUM  mg, Nightly       Medications Discontinued During This Encounter   Medication Reason    baclofen (LIORESAL) 20 MG tablet *Therapy completed    diazePAM (Valium) 5 MG tablet *Therapy completed    diclofenac (VOLTAREN) 75 MG EC tablet *Therapy completed    predniSONE (DELTASONE) 50 MG tablet *Therapy completed         Parts of this note are electronic transcriptions/translations of spoken language to printed text using the Dragon Dictation system.    Maida Peoples, FATIMAH  01/23/25  10:10 EST

## 2025-01-31 ENCOUNTER — OFFICE VISIT (OUTPATIENT)
Dept: FAMILY MEDICINE CLINIC | Facility: CLINIC | Age: 60
End: 2025-01-31
Payer: OTHER GOVERNMENT

## 2025-01-31 VITALS
HEIGHT: 63 IN | OXYGEN SATURATION: 97 % | SYSTOLIC BLOOD PRESSURE: 133 MMHG | TEMPERATURE: 97.1 F | WEIGHT: 204.5 LBS | HEART RATE: 83 BPM | DIASTOLIC BLOOD PRESSURE: 80 MMHG | BODY MASS INDEX: 36.23 KG/M2

## 2025-01-31 DIAGNOSIS — Z00.00 ANNUAL PHYSICAL EXAM: Primary | ICD-10-CM

## 2025-01-31 DIAGNOSIS — E78.2 MIXED HYPERLIPIDEMIA: ICD-10-CM

## 2025-01-31 DIAGNOSIS — E55.9 VITAMIN D DEFICIENCY: ICD-10-CM

## 2025-01-31 DIAGNOSIS — Z12.31 ENCOUNTER FOR SCREENING MAMMOGRAM FOR MALIGNANT NEOPLASM OF BREAST: ICD-10-CM

## 2025-01-31 DIAGNOSIS — R73.03 PREDIABETES: ICD-10-CM

## 2025-01-31 DIAGNOSIS — F40.240 CLAUSTROPHOBIA: ICD-10-CM

## 2025-01-31 DIAGNOSIS — G50.0 TRIGEMINAL NEURALGIA: ICD-10-CM

## 2025-01-31 DIAGNOSIS — Z78.0 POSTMENOPAUSAL: ICD-10-CM

## 2025-01-31 LAB
BILIRUB BLD-MCNC: NEGATIVE MG/DL
CLARITY, POC: CLEAR
COLOR UR: YELLOW
EXPIRATION DATE: 0
GLUCOSE UR STRIP-MCNC: NEGATIVE MG/DL
KETONES UR QL: NEGATIVE
LEUKOCYTE EST, POC: NEGATIVE
Lab: 0
NITRITE UR-MCNC: NEGATIVE MG/ML
PH UR: 7 [PH] (ref 5–8)
PROT UR STRIP-MCNC: NEGATIVE MG/DL
RBC # UR STRIP: NEGATIVE /UL
SP GR UR: 1.01 (ref 1–1.03)
UROBILINOGEN UR QL: NORMAL

## 2025-01-31 PROCEDURE — 81003 URINALYSIS AUTO W/O SCOPE: CPT | Performed by: NURSE PRACTITIONER

## 2025-01-31 PROCEDURE — 99396 PREV VISIT EST AGE 40-64: CPT | Performed by: NURSE PRACTITIONER

## 2025-01-31 RX ORDER — DIAZEPAM 5 MG/1
5 TABLET ORAL ONCE AS NEEDED
Qty: 1 TABLET | Refills: 0 | Status: SHIPPED | OUTPATIENT
Start: 2025-01-31

## 2025-01-31 NOTE — ASSESSMENT & PLAN NOTE
I will check her A1c when she returns for fasting labs.  Handout on prediabetes provided, see AVS.  Orders:    Hemoglobin A1c; Future

## 2025-01-31 NOTE — ASSESSMENT & PLAN NOTE
Lipid abnormalities are newly identified  She has not had her lipid panel checked in almost a year so I will have her come back for fasting lipid panel.  Plan:  Lipid lowering therapy not prescribed due to patient refusal    Counseled patient on lifestyle modifications to help control hyperlipidemia.   Cholesterol lowering dietary information shared with patient.    Patient Treatment Goals:   LDL goal is less than 70    Followup in 6 months.    Orders:    Lipid Panel; Future

## 2025-01-31 NOTE — PROGRESS NOTES
Chief Complaint  Follow-up (Jaw update /Would to take about MRI ), Annual Exam, and Hyperlipidemia    Subjective            Jazmin Jennings is a 59 y.o. female who presents to [facility]   History of Present Illness  She is here for a 1 week follow-up and for an annual physical.   She is accompanied by her  Evan.    Vitamin D deficiency.  She is not currently taking vitamin D.    She has been having jaw pain.  I started her on gabapentin 300 mg 3 times daily. She states that the gabapentin has helped.  She is taking it 3 times daily.  I have ordered an MRI IAC to check for trigeminal neuralgia.  She does have claustrophobia and had to take a diazepam prior to her last MRI.  She needs a prescription for another diazepam.    She also had cellulitis of the buccal space and I started her on amoxicillin. She states this is getting better.  She states the swelling has improved.    Discussed and reviewed   Alcohol Misuse Screening and Counseling, denies.   Bone Density Measurements  Cardiovascular Disease Screening Tests, fasting lipid panel, last time one year ago. LDL was very high at 214, total cholesterol 286.  She did not want to take a statin medication.  Colorectal Cancer Screening, Colonoscopy, last done on 9/17/2024, was not a good prep and has to reschedule.   Counseling to Prevent Tobacco Use Jazmin Jennings  reports that she has never smoked. She has never used smokeless tobacco.      Diabetes Screening- A1c was 6.0%, indicating prediabetes. She states she stopped all sugar last week.  She is hoping that her labs will look better.  Glaucoma screening, recommend routine vision exams.  Recommend routine biannual dental exams.  Hepatitis C Virus Screening , completed.   Human Immunodeficiency Virus (HIV) Screening, declines.   Screening for Sexually Transmitted Infections (STIs), declines.   Screening Mammography, due 2/27/2025  Screening Pelvic Examinations/Pap tests  "(Includes a clinical breast examination), due 2026  [facility] IMMUNIZATIONS: refuses all vaccines.       Tobacco Use: Low Risk  (1/31/2025)    Patient History     Smoking Tobacco Use: Never     Smokeless Tobacco Use: Never     Passive Exposure: Not on file      E-cigarette/Vaping    E-cigarette/Vaping Use Never User     Passive Exposure Yes     Counseling Given Yes      E-cigarette/Vaping Substances    Nicotine No     THC No     CBD No     Flavoring No      E-cigarette/Vaping Devices    Disposable No     Pre-filled or Refillable Cartridge No     Refillable Tank No     Pre-filled Pod No        Alcohol Use: Not on file         Objective   Vital Signs:   Vitals:    01/31/25 1007   BP: 133/80   Pulse: 83   Temp: 97.1 °F (36.2 °C)   SpO2: 97%   Weight: 92.8 kg (204 lb 8 oz)   Height: 160 cm (62.99\")     Body mass index is 36.23 kg/m².    Wt Readings from Last 3 Encounters:   01/31/25 92.8 kg (204 lb 8 oz)   01/23/25 93.9 kg (207 lb)   09/19/24 90.7 kg (200 lb)     BP Readings from Last 3 Encounters:   01/31/25 133/80   01/23/25 136/92   09/19/24 142/88       Health Maintenance   Topic Date Due    ANNUAL PHYSICAL  01/23/2025    LIPID PANEL  01/23/2025    MAMMOGRAM  02/27/2025    ZOSTER VACCINE (1 of 2) 01/31/2025 (Originally 4/28/2013)    INFLUENZA VACCINE  03/31/2025 (Originally 7/1/2024)    COVID-19 Vaccine (1 - 2024-25 season) 09/05/2025 (Originally 9/1/2024)    BMI FOLLOWUP  07/30/2025    PAP SMEAR  09/20/2026    TDAP/TD VACCINES (2 - Td or Tdap) 01/23/2034    COLORECTAL CANCER SCREENING  09/17/2034    HEPATITIS C SCREENING  Completed    Pneumococcal Vaccine 0-64  Aged Out       /80   Pulse 83   Temp 97.1 °F (36.2 °C)   Ht 160 cm (62.99\")   Wt 92.8 kg (204 lb 8 oz)   SpO2 97%   BMI 36.23 kg/m²       Current Outpatient Medications:     amoxicillin (AMOXIL) 875 MG tablet, Take 1 tablet by mouth 2 (Two) Times a Day for 10 days., Disp: 20 tablet, Rfl: 0    gabapentin (NEURONTIN) 300 MG capsule, Take 1 capsule by " mouth 3 (Three) Times a Day for 14 days. Indications: Trigeminal Nerve Pain, Disp: 42 capsule, Rfl: 0    MAGNESIUM PO, Take 362 mg by mouth Every Night., Disp: , Rfl:     diazePAM (Valium) 5 MG tablet, Take 1 tablet by mouth 1 (One) Time As Needed (claustrophobia one hour prior to MRI) for up to 1 dose., Disp: 1 tablet, Rfl: 0   Past Medical History:   Diagnosis Date    Arthritis     Bunion     GERD (gastroesophageal reflux disease)     Ingrown toenail     PONV (postoperative nausea and vomiting)     Tear of lateral meniscus of right knee         Physical Exam  Vitals reviewed.   Constitutional:       Appearance: Normal appearance. She is well-developed. She is obese.   HENT:      Right Ear: Tympanic membrane, ear canal and external ear normal.      Left Ear: Tympanic membrane, ear canal and external ear normal.      Mouth/Throat:      Mouth: Mucous membranes are moist.      Pharynx: No pharyngeal swelling, oropharyngeal exudate or posterior oropharyngeal erythema.   Neck:      Thyroid: No thyroid mass, thyromegaly or thyroid tenderness.   Cardiovascular:      Rate and Rhythm: Normal rate and regular rhythm.      Heart sounds: No murmur heard.     No friction rub. No gallop.   Pulmonary:      Effort: Pulmonary effort is normal.      Breath sounds: Normal breath sounds. No wheezing or rhonchi.   Lymphadenopathy:      Cervical: No cervical adenopathy.   Skin:     General: Skin is warm and dry.   Neurological:      Mental Status: She is alert and oriented to person, place, and time.      Cranial Nerves: No cranial nerve deficit.   Psychiatric:         Mood and Affect: Mood and affect normal.         Behavior: Behavior normal.         Thought Content: Thought content normal. Thought content does not include homicidal or suicidal ideation.         Judgment: Judgment normal.          Result Review :    The following data was reviewed by: FATIMAH Beckman on 01/31/2025:     XR Spine Cervical Complete 4 or 5  View    Result Date: 11/18/2024  Impression: Degenerative change. No acute osseous abnormalities are identified. Electronically Signed: Lincoln Wilson MD  11/18/2024 12:38 PM EST  Workstation ID: [id number]    XR Spine Thoracic 3 View    Result Date: 11/18/2024  Impression: Mild degenerative change and minimal scoliosis. No acute osseous abnormalities are identified. Electronically Signed: Lincoln Wilson MD  11/18/2024 12:28 PM EST  Workstation ID: [id number]    MRI Temporomandibular Joints    Result Date: 11/13/2024  Impression: 1.The articular discs of both the temporomandibular joints demonstrate some intermediate signal, and there appears to be diminution of the posterior band of the right articular disc, suspected to be related to degenerative changes. 2.No definite findings of disc malalignment or other etiology for patient's symptoms between the open and closed mouth views on these images. Electronically Signed: Angel Maeve  11/13/2024 8:18 PM EST  Workstation ID: [id number]    Assessment & Plan  Annual physical exam  Health maintenance and prevention discussed, handouts provided, see AVS.  Orders:    POCT urinalysis dipstick, automated    TSH Rfx On Abnormal To Free T4; Future    CBC Auto Differential; Future    Comprehensive Metabolic Panel; Future    Lipid Panel; Future    Claustrophobia  She has claustrophobia and did well with the diazepam prior to her last MRI.  Misael was reviewed and is consistent.  Valium/diazepam 5 mg x 1 dose 1 hour prior to MRI prescription sent to pharmacy.  Orders:    diazePAM (Valium) 5 MG tablet; Take 1 tablet by mouth 1 (One) Time As Needed (claustrophobia one hour prior to MRI) for up to 1 dose.    Trigeminal neuralgia  MRI has been ordered to check for trigeminal neuralgia.  She is getting some relief with gabapentin and will continue 300 mg 3 times daily.  She will notify me if she needs a refill.  Advised her to follow-up with me after MRI obtained.       Prediabetes  I will  check her A1c when she returns for fasting labs.  Handout on prediabetes provided, see AVS.  Orders:    Hemoglobin A1c; Future    Mixed hyperlipidemia   Lipid abnormalities are newly identified  She has not had her lipid panel checked in almost a year so I will have her come back for fasting lipid panel.  Plan:  Lipid lowering therapy not prescribed due to patient refusal    Counseled patient on lifestyle modifications to help control hyperlipidemia.   Cholesterol lowering dietary information shared with patient.    Patient Treatment Goals:   LDL goal is less than 70    Followup in 6 months.    Orders:    Lipid Panel; Future    Vitamin D deficiency  I will check her vitamin D level when she returns for fasting labs.  Handout on vitamin D provided, see AVS.  Orders:    Vitamin D,25-Hydroxy; Future    Postmenopausal  We discussed bone density scan is recommended to check for osteoporosis, order placed.  Orders:    DEXA Bone Density Axial; Future    Encounter for screening mammogram for malignant neoplasm of breast  We discussed mammogram is coming up due so I went ahead and put in the order.  Orders:    Mammo Screening Digital Tomosynthesis Bilateral With CAD; Future          Diagnosis Plan   1. Annual physical exam  POCT urinalysis dipstick, automated    TSH Rfx On Abnormal To Free T4    CBC Auto Differential    Comprehensive Metabolic Panel    Lipid Panel      2. Claustrophobia  diazePAM (Valium) 5 MG tablet      3. Vitamin D deficiency  Vitamin D,25-Hydroxy      4. Prediabetes  Hemoglobin A1c      5. Mixed hyperlipidemia  Lipid Panel      6. Postmenopausal  DEXA Bone Density Axial      7. Encounter for screening mammogram for malignant neoplasm of breast  Mammo Screening Digital Tomosynthesis Bilateral With CAD      8. Trigeminal neuralgia              FOLLOW UP  Return for Fasting Labs in 1-2 weeks and f/u after MRI.  Patient was given instructions and counseling regarding her condition or for health maintenance  advice. Please see specific information pulled into the AVS if appropriate.       CURRENT & DISCONTINUED MEDICATIONS  Current Outpatient Medications   Medication Instructions    amoxicillin (AMOXIL) 875 mg, Oral, 2 Times Daily    diazePAM (VALIUM) 5 mg, Oral, Once As Needed    gabapentin (NEURONTIN) 300 mg, Oral, 3 Times Daily    MAGNESIUM  mg, Nightly       There are no discontinued medications.     Parts of this note are electronic transcriptions/translations of spoken language to printed text using the Dragon Dictation system.    FATIMAH Beckamn  01/31/25  14:01 EST

## 2025-01-31 NOTE — ASSESSMENT & PLAN NOTE
I will check her vitamin D level when she returns for fasting labs.  Handout on vitamin D provided, see AVS.  Orders:    Vitamin D,25-Hydroxy; Future

## 2025-01-31 NOTE — ASSESSMENT & PLAN NOTE
She has claustrophobia and did well with the diazepam prior to her last MRI.  Misael was reviewed and is consistent.  Valium/diazepam 5 mg x 1 dose 1 hour prior to MRI prescription sent to pharmacy.  Orders:    diazePAM (Valium) 5 MG tablet; Take 1 tablet by mouth 1 (One) Time As Needed (claustrophobia one hour prior to MRI) for up to 1 dose.

## 2025-01-31 NOTE — ASSESSMENT & PLAN NOTE
We discussed bone density scan is recommended to check for osteoporosis, order placed.  Orders:    DEXA Bone Density Axial; Future

## 2025-02-06 ENCOUNTER — LAB (OUTPATIENT)
Dept: FAMILY MEDICINE CLINIC | Facility: CLINIC | Age: 60
End: 2025-02-06
Payer: OTHER GOVERNMENT

## 2025-02-06 DIAGNOSIS — E78.2 MIXED HYPERLIPIDEMIA: ICD-10-CM

## 2025-02-06 DIAGNOSIS — R73.03 PREDIABETES: ICD-10-CM

## 2025-02-06 DIAGNOSIS — Z00.00 ANNUAL PHYSICAL EXAM: ICD-10-CM

## 2025-02-06 DIAGNOSIS — E55.9 VITAMIN D DEFICIENCY: ICD-10-CM

## 2025-02-06 LAB
25(OH)D3 SERPL-MCNC: 33.3 NG/ML (ref 30–100)
ALBUMIN SERPL-MCNC: 4.4 G/DL (ref 3.5–5.2)
ALBUMIN/GLOB SERPL: 1.5 G/DL
ALP SERPL-CCNC: 60 U/L (ref 39–117)
ALT SERPL W P-5'-P-CCNC: 42 U/L (ref 1–33)
ANION GAP SERPL CALCULATED.3IONS-SCNC: 10.9 MMOL/L (ref 5–15)
AST SERPL-CCNC: 34 U/L (ref 1–32)
BASOPHILS # BLD AUTO: 0.07 10*3/MM3 (ref 0–0.2)
BASOPHILS NFR BLD AUTO: 1.3 % (ref 0–1.5)
BILIRUB SERPL-MCNC: 0.4 MG/DL (ref 0–1.2)
BUN SERPL-MCNC: 20 MG/DL (ref 8–23)
BUN/CREAT SERPL: 24.7 (ref 7–25)
CALCIUM SPEC-SCNC: 9.3 MG/DL (ref 8.6–10.5)
CHLORIDE SERPL-SCNC: 105 MMOL/L (ref 98–107)
CHOLEST SERPL-MCNC: 244 MG/DL (ref 0–200)
CO2 SERPL-SCNC: 24.1 MMOL/L (ref 22–29)
CREAT SERPL-MCNC: 0.81 MG/DL (ref 0.57–1)
DEPRECATED RDW RBC AUTO: 40.6 FL (ref 37–54)
EGFRCR SERPLBLD CKD-EPI 2021: 82.7 ML/MIN/1.73
EOSINOPHIL # BLD AUTO: 0.26 10*3/MM3 (ref 0–0.4)
EOSINOPHIL NFR BLD AUTO: 4.8 % (ref 0.3–6.2)
ERYTHROCYTE [DISTWIDTH] IN BLOOD BY AUTOMATED COUNT: 12.3 % (ref 12.3–15.4)
GLOBULIN UR ELPH-MCNC: 2.9 GM/DL
GLUCOSE SERPL-MCNC: 100 MG/DL (ref 65–99)
HBA1C MFR BLD: 5.8 % (ref 4.8–5.6)
HCT VFR BLD AUTO: 42 % (ref 34–46.6)
HDLC SERPL-MCNC: 43 MG/DL (ref 40–60)
HGB BLD-MCNC: 14.2 G/DL (ref 12–15.9)
IMM GRANULOCYTES # BLD AUTO: 0.01 10*3/MM3 (ref 0–0.05)
IMM GRANULOCYTES NFR BLD AUTO: 0.2 % (ref 0–0.5)
LDLC SERPL CALC-MCNC: 181 MG/DL (ref 0–100)
LDLC/HDLC SERPL: 4.17 {RATIO}
LYMPHOCYTES # BLD AUTO: 2.43 10*3/MM3 (ref 0.7–3.1)
LYMPHOCYTES NFR BLD AUTO: 45.3 % (ref 19.6–45.3)
MCH RBC QN AUTO: 30.1 PG (ref 26.6–33)
MCHC RBC AUTO-ENTMCNC: 33.8 G/DL (ref 31.5–35.7)
MCV RBC AUTO: 89.2 FL (ref 79–97)
MONOCYTES # BLD AUTO: 0.41 10*3/MM3 (ref 0.1–0.9)
MONOCYTES NFR BLD AUTO: 7.6 % (ref 5–12)
NEUTROPHILS NFR BLD AUTO: 2.19 10*3/MM3 (ref 1.7–7)
NEUTROPHILS NFR BLD AUTO: 40.8 % (ref 42.7–76)
NRBC BLD AUTO-RTO: 0 /100 WBC (ref 0–0.2)
PLATELET # BLD AUTO: 273 10*3/MM3 (ref 140–450)
PMV BLD AUTO: 10.9 FL (ref 6–12)
POTASSIUM SERPL-SCNC: 4.5 MMOL/L (ref 3.5–5.2)
PROT SERPL-MCNC: 7.3 G/DL (ref 6–8.5)
RBC # BLD AUTO: 4.71 10*6/MM3 (ref 3.77–5.28)
SODIUM SERPL-SCNC: 140 MMOL/L (ref 136–145)
T4 FREE SERPL-MCNC: 1.18 NG/DL (ref 0.92–1.68)
TRIGL SERPL-MCNC: 109 MG/DL (ref 0–150)
TSH SERPL DL<=0.05 MIU/L-ACNC: 4.28 UIU/ML (ref 0.27–4.2)
VLDLC SERPL-MCNC: 20 MG/DL (ref 5–40)
WBC NRBC COR # BLD AUTO: 5.37 10*3/MM3 (ref 3.4–10.8)

## 2025-02-06 PROCEDURE — 84443 ASSAY THYROID STIM HORMONE: CPT | Performed by: NURSE PRACTITIONER

## 2025-02-06 PROCEDURE — 85025 COMPLETE CBC W/AUTO DIFF WBC: CPT | Performed by: NURSE PRACTITIONER

## 2025-02-06 PROCEDURE — 82306 VITAMIN D 25 HYDROXY: CPT | Performed by: NURSE PRACTITIONER

## 2025-02-06 PROCEDURE — 84439 ASSAY OF FREE THYROXINE: CPT | Performed by: NURSE PRACTITIONER

## 2025-02-06 PROCEDURE — 83036 HEMOGLOBIN GLYCOSYLATED A1C: CPT | Performed by: NURSE PRACTITIONER

## 2025-02-06 PROCEDURE — 80053 COMPREHEN METABOLIC PANEL: CPT | Performed by: NURSE PRACTITIONER

## 2025-02-06 PROCEDURE — 36415 COLL VENOUS BLD VENIPUNCTURE: CPT | Performed by: NURSE PRACTITIONER

## 2025-02-06 PROCEDURE — 80061 LIPID PANEL: CPT | Performed by: NURSE PRACTITIONER

## 2025-02-07 ENCOUNTER — PATIENT ROUNDING (BHMG ONLY) (OUTPATIENT)
Dept: FAMILY MEDICINE CLINIC | Facility: CLINIC | Age: 60
End: 2025-02-07
Payer: OTHER GOVERNMENT

## 2025-02-14 ENCOUNTER — TELEPHONE (OUTPATIENT)
Dept: FAMILY MEDICINE CLINIC | Facility: CLINIC | Age: 60
End: 2025-02-14

## 2025-02-14 DIAGNOSIS — G50.0 TRIGEMINAL NEURALGIA: ICD-10-CM

## 2025-02-14 DIAGNOSIS — R68.84 JAW PAIN: Primary | ICD-10-CM

## 2025-02-14 RX ORDER — GABAPENTIN 300 MG/1
300 CAPSULE ORAL 3 TIMES DAILY
Qty: 90 CAPSULE | Refills: 0 | Status: SHIPPED | OUTPATIENT
Start: 2025-02-14

## 2025-02-14 RX ORDER — AMOXICILLIN 500 MG/1
500 CAPSULE ORAL 2 TIMES DAILY
Qty: 14 CAPSULE | Refills: 0 | Status: SHIPPED | OUTPATIENT
Start: 2025-02-14 | End: 2025-02-21

## 2025-02-14 NOTE — TELEPHONE ENCOUNTER
Pt believes due to infection starting back up. She has plans to go next week to visit her daughter out of [place] for two weeks. She would like another round of antibiotics as well as a refill of gabapentin.

## 2025-02-14 NOTE — TELEPHONE ENCOUNTER
Caller: Jazmin Jennings    Relationship: Self    Best call back number: [phone]     What was the call regarding: PATIENT STATES SHE WAS INSTRUCTED TO CALL IF HER JAW WAS HURTING AGAIN AND IT IS. PATIENT STATES SHE WOULD LIKE TO KNOW WHAT CAN BE DONE TO HELP WITH THE SITUATION.

## 2025-03-24 ENCOUNTER — HOSPITAL ENCOUNTER (OUTPATIENT)
Dept: BONE DENSITY | Facility: HOSPITAL | Age: 60
Discharge: HOME | End: 2025-03-24
Payer: OTHER GOVERNMENT

## 2025-03-24 ENCOUNTER — HOSPITAL ENCOUNTER (OUTPATIENT)
Dept: MAMMOGRAPHY | Facility: HOSPITAL | Age: 60
Discharge: HOME | End: 2025-03-24
Payer: OTHER GOVERNMENT

## 2025-03-24 DIAGNOSIS — Z12.31 ENCOUNTER FOR SCREENING MAMMOGRAM FOR MALIGNANT NEOPLASM OF BREAST: ICD-10-CM

## 2025-03-24 DIAGNOSIS — Z78.0 POSTMENOPAUSAL: ICD-10-CM

## 2025-03-24 PROCEDURE — 77080 DXA BONE DENSITY AXIAL: CPT

## 2025-03-24 PROCEDURE — 77067 SCR MAMMO BI INCL CAD: CPT

## 2025-03-24 PROCEDURE — 77063 BREAST TOMOSYNTHESIS BI: CPT

## 2025-03-25 ENCOUNTER — TELEMEDICINE (OUTPATIENT)
Dept: FAMILY MEDICINE CLINIC | Facility: CLINIC | Age: 60
End: 2025-03-25
Payer: OTHER GOVERNMENT

## 2025-03-25 ENCOUNTER — TELEPHONE (OUTPATIENT)
Dept: FAMILY MEDICINE CLINIC | Facility: CLINIC | Age: 60
End: 2025-03-25

## 2025-03-25 ENCOUNTER — RESULTS FOLLOW-UP (OUTPATIENT)
Dept: BONE DENSITY | Facility: HOSPITAL | Age: 60
End: 2025-03-25
Payer: OTHER GOVERNMENT

## 2025-03-25 DIAGNOSIS — K12.2 CELLULITIS OF BUCCAL SPACE OF MOUTH: Primary | ICD-10-CM

## 2025-03-25 DIAGNOSIS — G50.0 TRIGEMINAL NEURALGIA: ICD-10-CM

## 2025-03-25 DIAGNOSIS — M85.80 OSTEOPENIA, UNSPECIFIED LOCATION: Primary | ICD-10-CM

## 2025-03-25 PROCEDURE — 99214 OFFICE O/P EST MOD 30 MIN: CPT | Performed by: NURSE PRACTITIONER

## 2025-03-25 RX ORDER — AMOXICILLIN 500 MG/1
500 CAPSULE ORAL 2 TIMES DAILY
Qty: 20 CAPSULE | Refills: 0 | Status: SHIPPED | OUTPATIENT
Start: 2025-03-25 | End: 2025-04-04

## 2025-03-25 RX ORDER — GABAPENTIN 300 MG/1
300 CAPSULE ORAL 3 TIMES DAILY
Qty: 90 CAPSULE | Refills: 2 | Status: SHIPPED | OUTPATIENT
Start: 2025-03-25

## 2025-03-25 NOTE — PROGRESS NOTES
Chief Complaint  Facial Swelling    Subjective         Jazmin Jennings presents to [facility]  History of Present Illness  History of Present Illness  The patient presents via virtual visit for evaluation of jaw pain and swelling.    She reports experiencing discomfort in her left lower molar, which she describes as a sensation akin to a significant bruise. She has been actively seeking an endodontist within our network throughout the morning. She has been on gabapentin for trigeminal neuralgia and continues to take it daily, with only 3 pills remaining in her current supply. She had previously contacted our office last month, at which time a prescription for amoxicillin was provided. She had one remaining dose of amoxicillin from a previous prescription, which she administered, resulting in a slight reduction in her symptoms.    MEDICATIONS  Current: Gabapentin, amoxicillin    Tobacco Use: Low Risk  (3/25/2025)    Patient History     Smoking Tobacco Use: Never     Smokeless Tobacco Use: Never     Passive Exposure: Not on file      Objective   Vital Signs:   There were no vitals taken for this visit.      Current Outpatient Medications:     gabapentin (NEURONTIN) 300 MG capsule, Take 1 capsule by mouth 3 (Three) Times a Day. Indications: Trigeminal Nerve Pain, Disp: 90 capsule, Rfl: 2    MAGNESIUM PO, Take 362 mg by mouth Every Night., Disp: , Rfl:     amoxicillin (AMOXIL) 500 MG capsule, Take 1 capsule by mouth 2 (Two) Times a Day for 10 days. Indications: infected tooth, Disp: 20 capsule, Rfl: 0  Physical Exam   Constitutional: She appears well-developed and well-nourished.   HENT:   Left lower molar buccal space mild swelling noted.    Pulmonary/Chest: Effort normal.   Neurological: She is alert.   Psychiatric: She has a normal mood and affect.     Physical Exam      Result Review :       Results                  Assessment and Plan    Diagnoses and all orders for this visit:    1.  Cellulitis of buccal space of mouth (Primary)  -     amoxicillin (AMOXIL) 500 MG capsule; Take 1 capsule by mouth 2 (Two) Times a Day for 10 days. Indications: infected tooth  Dispense: 20 capsule; Refill: 0    2. Trigeminal neuralgia  -     gabapentin (NEURONTIN) 300 MG capsule; Take 1 capsule by mouth 3 (Three) Times a Day. Indications: Trigeminal Nerve Pain  Dispense: 90 capsule; Refill: 2        Assessment & Plan  1. Trigeminal neuralgia.  She has been on gabapentin for trigeminal neuralgia and continues to take it daily, with only 3 pills remaining in her current supply. A prescription for gabapentin, inclusive of 2 refills, will be provided to ensure continuity of treatment. The prescription will be sent to [facility].  Misael reviewed and is consistent.    2. Dental infection.  She reports experiencing discomfort in her left lower molar. She had previously contacted our office last month, at which time a prescription for amoxicillin was provided. A prescription for amoxicillin will be issued to manage the ongoing dental infection. She is advised to seek immediate consultation with a dentist or endodontist for further evaluation and management. The prescription will be sent to [facility].      Follow Up   Return if symptoms worsen or fail to improve.  Patient was given instructions and counseling regarding her condition or for health maintenance advice. Please see specific information pulled into the AVS if appropriate.     Mode of Visit: Video  Location of patient: home  Location of provider: [facility] [facility]  You have chosen to receive care through a telehealth visit.  Does the patient consent to use a video/audio connection for your medical care today? Yes  The visit included audio and video interaction. No technical issues occurred during this visit.   Patient understanding that this is a telehealth visit.  I cannot perform a full physical exam, therefore something might be missed, or patient may need to come  into the office for further evaluation.  Patient is understanding and consents to this type of visit.    Patient or patient representative verbalized consent for the use of Ambient Listening during the visit with  FATIMAH Beckman for chart documentation. 3/25/2025  14:36 EDT    FATIMAH Beckman  03/25/2025

## 2025-03-25 NOTE — PROGRESS NOTES
Bone density shows osteopenia which is the prestate just to osteoporosis.  She needs to be taking calcium with vitamin D twice daily, I will send in a prescription but she can buy this over-the-counter if her insurance does not cover it.  Bone density scan needs to be repeated in 2 years.

## 2025-03-25 NOTE — TELEPHONE ENCOUNTER
Caller: Jazmin Jennings    Relationship: Self    Best call back number: [phone]    What is the best time to reach you: ANYTIME    Who are you requesting to speak with (clinical staff, provider,  specific staff member): PINA PORTILLO OR HER NURSE    Do you know the name of the person who called: PATIENT    What was the call regarding: PATIENT WOKE UP TODAY AND HER JAW WAS SWOLLEN AGAIN. PLEASE CALL PATIENT BACK AND ADVISE.    Is it okay if the provider responds through MyChart: N/A

## 2025-04-11 ENCOUNTER — HOSPITAL ENCOUNTER (OUTPATIENT)
Dept: MRI IMAGING | Facility: HOSPITAL | Age: 60
Discharge: HOME | End: 2025-04-11
Payer: OTHER GOVERNMENT

## 2025-04-11 DIAGNOSIS — G50.0 TRIGEMINAL NEURALGIA: ICD-10-CM

## 2025-04-11 DIAGNOSIS — R68.84 CHRONIC JAW PAIN: ICD-10-CM

## 2025-04-11 DIAGNOSIS — G89.29 CHRONIC JAW PAIN: ICD-10-CM

## 2025-04-11 DIAGNOSIS — M47.22 CERVICAL RADICULOPATHY DUE TO DEGENERATIVE JOINT DISEASE OF SPINE: ICD-10-CM

## 2025-04-11 DIAGNOSIS — R68.84 JAW PAIN: ICD-10-CM

## 2025-04-11 LAB
CREAT BLDA-MCNC: 1 MG/DL (ref 0.6–1.3)
EGFRCR SERPLBLD CKD-EPI 2021: 64.2 ML/MIN/1.73

## 2025-04-11 PROCEDURE — 70553 MRI BRAIN STEM W/O & W/DYE: CPT

## 2025-04-11 PROCEDURE — 82565 ASSAY OF CREATININE: CPT

## 2025-04-11 PROCEDURE — 25510000002 GADOBENATE DIMEGLUMINE 529 MG/ML SOLUTION: Performed by: NURSE PRACTITIONER

## 2025-04-11 PROCEDURE — 72141 MRI NECK SPINE W/O DYE: CPT

## 2025-04-11 PROCEDURE — A9577 INJ MULTIHANCE: HCPCS | Performed by: NURSE PRACTITIONER

## 2025-04-11 RX ADMIN — GADOBENATE DIMEGLUMINE 20 ML: 529 INJECTION, SOLUTION INTRAVENOUS at 11:24

## 2025-05-13 ENCOUNTER — TELEPHONE (OUTPATIENT)
Dept: NEUROSURGERY | Facility: CLINIC | Age: 60
End: 2025-05-13

## 2025-05-29 ENCOUNTER — OFFICE VISIT (OUTPATIENT)
Dept: NEUROSURGERY | Facility: CLINIC | Age: 60
End: 2025-05-29
Payer: OTHER GOVERNMENT

## 2025-05-29 VITALS
BODY MASS INDEX: 35.97 KG/M2 | OXYGEN SATURATION: 97 % | DIASTOLIC BLOOD PRESSURE: 84 MMHG | HEART RATE: 62 BPM | WEIGHT: 203 LBS | SYSTOLIC BLOOD PRESSURE: 122 MMHG | HEIGHT: 63 IN

## 2025-05-29 DIAGNOSIS — M47.812 SPONDYLOSIS OF CERVICAL REGION WITHOUT MYELOPATHY OR RADICULOPATHY: Primary | ICD-10-CM

## 2025-05-29 DIAGNOSIS — M48.02 NEURAL FORAMINAL STENOSIS OF CERVICAL SPINE: ICD-10-CM

## 2025-05-29 NOTE — PROGRESS NOTES
"Chief Complaint  Neck Pain, Headache, and Shoulder Pain (Some bilateral pain )    Subjective          Jazmin Jennings who is a 60 y.o. year old female who presents to [facility] for Evaluation of the Spine.     The patient complains of pain located in the Cervical Spine.  Patients states the pain has been present for several years.  The pain came on gradually.  The pain scaled level is 4.  The pain  extends to the muscles of the top shoulders, but does not radiate into the arm .  The pain is  mostly constant and waxing/waning, rarely it goes away  and described as aching.  The pain is worse in the morning and at no particular time of day. Patient states Heat and Ice makes the pain better.  Patient states  working in the garden makes the pain worse.    Associated Symptoms Include: Denies Numbness, Tingling, and Weakness  Conservative Interventions Include:  Chiropractor that was somewhat effective.    Was this the result of an injury or accident? : No, but she reports fall off ladder 20 years ago.    History of Previous Spinal Surgery?: No     reports that she has never smoked. She has never used smokeless tobacco.    Review of Systems   Musculoskeletal:  Positive for neck pain.        Objective   Vital Signs:   /84   Pulse 62   Ht 160 cm (62.99\")   Wt 92.1 kg (203 lb)   SpO2 97%   BMI 35.97 kg/m²       Physical Exam  Constitutional:       Appearance: Normal appearance. She is obese.   Neck:      Comments: Pain with ROM  Pulmonary:      Effort: Pulmonary effort is normal.   Musculoskeletal:         General: No tenderness.      Comments: Zavala's negative bilaterally   Neurological:      General: No focal deficit present.      Mental Status: She is alert and oriented to person, place, and time.      Sensory: No sensory deficit.      Motor: No weakness.      Deep Tendon Reflexes: Reflexes normal.   Psychiatric:         Mood and Affect: Mood normal.         Behavior: " Behavior normal.        Neurological Exam  Mental Status  Alert. Oriented to person, place, and time.      Result Review     I have independently interpreted the MRI of the cervical spine without contrast from 4/11/2025 which shows multilevel spondylosis with disc bulging most notable at C5-C6 and C6-C7.  There is mild to moderate left foraminal stenosis at C6-C7.  There is mild canal stenosis at C5-C6 possibly contacting the spinal cord without spinal cord signal change.     Assessment and Plan    Diagnoses and all orders for this visit:    1. Spondylosis of cervical region without myelopathy or radiculopathy (Primary)    2. Neural foraminal stenosis of cervical spine    Her pain is in the neck and tops of the shoulders.    She does not have pain radiating into the arms.    There is left disc bulging at C6-C7 with mild to moderate left foraminal stenosis, but otherwise no significant foraminal narrowing.    If she develops left arm pain in a C7 pattern (the middle 3 fingers), surgery may be indicated.    Surgery typically would not offer benefit for neck pain, however.    I would recommend conservative treatment targeting the cervical spine, which could include physical therapy or pain management consultation for possible spinal injections.    She is going to focus on home exercise for strengthening.    The patient was counseled on basic recommendations for the reduction and prevention of back, neck, or spine pain in association with spinal disorders, including: cessation/avoidance of nicotine use, maintenance of a healthy BMI and weight, focusing on building/maintaining core strength through core exercise, and avoidance of activities which worsen the pain. The patient will monitor for changes in symptoms and notify our clinic of these changes as needed.    Follow Up   Return if symptoms worsen or fail to improve.  Patient was given instructions and counseling regarding her condition or for health maintenance  advice. Please see specific information pulled into the AVS if appropriate.

## (undated) DEVICE — APPL CHLORAPREP HI/LITE 26ML ORNG

## (undated) DEVICE — POSTN ELEV LEG PROCARE FM UNIV 45DEG 31.5X10IN

## (undated) DEVICE — DRSNG WND GZ CURAD OIL EMULSION 3X3IN STRL

## (undated) DEVICE — SPNG GZ WOVN 4X4IN 12PLY 10/BX STRL

## (undated) DEVICE — DRILL BIT, AO, SCALED: Brand: VARIAX

## (undated) DEVICE — GLV SURG SENSICARE PI ORTHO SZ8 LF STRL

## (undated) DEVICE — DISPOSABLE TOURNIQUET CUFF SINGLE BLADDER, SINGLE PORT AND QUICK CONNECT CONNECTOR: Brand: COLOR CUFF

## (undated) DEVICE — BLD CUT FORMLA AGGR PLS 5.0MM

## (undated) DEVICE — SOL IRR NACL 0.9PCT BT 1000ML

## (undated) DEVICE — CONVEX REAMER

## (undated) DEVICE — KIRSCHNER WIRE WITH STOP WITH MM STOP
Type: IMPLANTABLE DEVICE | Site: FOOT | Status: NON-FUNCTIONAL
Brand: VARIAX
Removed: 2021-01-21

## (undated) DEVICE — COVER,MAYO STAND,STERILE: Brand: MEDLINE

## (undated) DEVICE — PROB ABLAT SERFAS ENERGY HK 3.5MM

## (undated) DEVICE — KIRSCHNER WIRE
Type: IMPLANTABLE DEVICE | Site: FOOT | Status: NON-FUNCTIONAL
Removed: 2021-01-21

## (undated) DEVICE — BNDG ELAS ECON W/CLIP 6IN 5YD LF STRL

## (undated) DEVICE — SNAR POLYP CAPTIFLEX XS/OVL 11X2.4MM 240CM 1P/U

## (undated) DEVICE — GLV SURG SENSICARE POLYISPRN W/ALOE PF LF 6.5 GRN STRL

## (undated) DEVICE — PK POD 60

## (undated) DEVICE — SCREWDRIVER BLADE T8 AO, SELF RETAINING: Brand: VARIAX

## (undated) DEVICE — SUT ETHLN 4/0 PS2 18IN 1667H

## (undated) DEVICE — SOL IRR NACL 0.9PCT 3000ML

## (undated) DEVICE — LINER SURG CANSTR SXN S/RIGD 1500CC

## (undated) DEVICE — CP LAG SCREW (T8)
Type: IMPLANTABLE DEVICE | Site: FOOT | Status: NON-FUNCTIONAL
Brand: ANCHORAGE
Removed: 2021-01-21

## (undated) DEVICE — GOWN,PREVENTION PLUS,XLNG/XXLARGE,STRL: Brand: MEDLINE

## (undated) DEVICE — KIRSCHNER WIRE , L, TIPS TROCAR , SMOOTH
Type: IMPLANTABLE DEVICE | Site: FOOT | Status: NON-FUNCTIONAL
Removed: 2021-01-21

## (undated) DEVICE — BNDG ESMARK STRL 6INX12FT LF

## (undated) DEVICE — DRILL BIT

## (undated) DEVICE — BONE SCREW, FULLY THREADED, T8
Type: IMPLANTABLE DEVICE | Site: FOOT | Status: NON-FUNCTIONAL
Brand: VARIAX
Removed: 2021-01-21

## (undated) DEVICE — SOLIDIFIER LIQLOC PLS 1500CC BT

## (undated) DEVICE — GLV SURG SENSICARE PI ORTHO SZ6.5 LF STRL

## (undated) DEVICE — SUT ETHLN 3-0 FS118IN 663H

## (undated) DEVICE — SOL IRRG H2O PL/BG 1000ML STRL

## (undated) DEVICE — Device: Brand: DEFENDO AIR/WATER/SUCTION AND BIOPSY VALVE

## (undated) DEVICE — SUT VIC 3/0 SH 27IN J416H

## (undated) DEVICE — GLV SURG SENSICARE PI ORTHO SZ7.5 LF STRL

## (undated) DEVICE — DRESSING,GAUZE,XEROFORM,CURAD,1"X8",ST: Brand: CURAD

## (undated) DEVICE — SUT MONOCRYL 4/0 PS2 27IN Y426H ETY426H

## (undated) DEVICE — KNEE ARTHROSCOPY-LF: Brand: MEDLINE INDUSTRIES, INC.

## (undated) DEVICE — CONN JET HYDRA H20 AUXILIARY DISP

## (undated) DEVICE — CVR C/ARM MINI

## (undated) DEVICE — CONCAVE SURFACING REAMER

## (undated) DEVICE — Device

## (undated) DEVICE — THE SINGLE USE ETRAP – POLYP TRAP IS USED FOR SUCTION RETRIEVAL OF ENDOSCOPICALLY REMOVED POLYPS.: Brand: ETRAP

## (undated) DEVICE — INTEGRATED CASSETTE TUBING, DO NOT USE IF PACKAGE IS DAMAGED: Brand: CROSSFLOW

## (undated) DEVICE — STERILE POLYISOPRENE POWDER-FREE SURGICAL GLOVES: Brand: PROTEXIS